# Patient Record
Sex: MALE | Race: WHITE | NOT HISPANIC OR LATINO | Employment: OTHER | URBAN - METROPOLITAN AREA
[De-identification: names, ages, dates, MRNs, and addresses within clinical notes are randomized per-mention and may not be internally consistent; named-entity substitution may affect disease eponyms.]

---

## 2018-01-04 ENCOUNTER — CONVERSION ENCOUNTER (OUTPATIENT)
Dept: OTHER | Facility: HOSPITAL | Age: 78
End: 2018-01-04

## 2018-06-06 ENCOUNTER — OFFICE VISIT CONVERTED (OUTPATIENT)
Dept: OTHER | Facility: HOSPITAL | Age: 78
End: 2018-06-06
Attending: NURSE PRACTITIONER

## 2018-07-17 ENCOUNTER — CONVERSION ENCOUNTER (OUTPATIENT)
Dept: OTHER | Facility: HOSPITAL | Age: 78
End: 2018-07-17

## 2018-07-17 ENCOUNTER — OFFICE VISIT CONVERTED (OUTPATIENT)
Dept: OTHER | Facility: HOSPITAL | Age: 78
End: 2018-07-17
Attending: NURSE PRACTITIONER

## 2018-12-06 ENCOUNTER — OFFICE VISIT CONVERTED (OUTPATIENT)
Dept: OTHER | Facility: HOSPITAL | Age: 78
End: 2018-12-06
Attending: NURSE PRACTITIONER

## 2019-01-08 ENCOUNTER — HOSPITAL ENCOUNTER (OUTPATIENT)
Dept: OTHER | Facility: HOSPITAL | Age: 79
Discharge: HOME | End: 2019-01-08
Attending: NURSE PRACTITIONER

## 2019-01-08 LAB
ALBUMIN SERPL-MCNC: 4.1 G/DL (ref 3.5–5)
ALBUMIN/GLOB SERPL: 1.7 {RATIO} (ref 1.4–2.6)
ALP SERPL-CCNC: 73 U/L (ref 56–155)
ALT SERPL-CCNC: 14 U/L (ref 10–40)
ANION GAP SERPL CALC-SCNC: 18 MMOL/L (ref 8–19)
AST SERPL-CCNC: 18 U/L (ref 15–50)
BASOPHILS # BLD AUTO: 0 10*3/UL (ref 0–0.2)
BASOPHILS NFR BLD AUTO: 0.06 % (ref 0–3)
BILIRUB SERPL-MCNC: 0.63 MG/DL (ref 0.2–1.3)
BUN SERPL-MCNC: 21 MG/DL (ref 5–25)
BUN/CREAT SERPL: 15 {RATIO} (ref 6–20)
CALCIUM SERPL-MCNC: 10.3 MG/DL (ref 8.7–10.4)
CHLORIDE SERPL-SCNC: 107 MMOL/L (ref 99–111)
CHOLEST SERPL-MCNC: 102 MG/DL (ref 107–200)
CHOLEST/HDLC SERPL: 2.2 {RATIO} (ref 3–6)
CONV CO2: 24 MMOL/L (ref 22–32)
CONV TOTAL PROTEIN: 6.5 G/DL (ref 6.3–8.2)
CREAT UR-MCNC: 1.41 MG/DL (ref 0.7–1.2)
EOSINOPHIL # BLD AUTO: 0.11 10*3/UL (ref 0–0.7)
EOSINOPHIL # BLD AUTO: 1.94 % (ref 0–7)
ERYTHROCYTE [DISTWIDTH] IN BLOOD BY AUTOMATED COUNT: 12.9 % (ref 11.5–14.5)
GFR SERPLBLD BASED ON 1.73 SQ M-ARVRAT: 47 ML/MIN/{1.73_M2}
GLOBULIN UR ELPH-MCNC: 2.4 G/DL (ref 2–3.5)
GLUCOSE SERPL-MCNC: 89 MG/DL (ref 70–99)
HBA1C MFR BLD: 13.3 G/DL (ref 14–18)
HCT VFR BLD AUTO: 40.2 % (ref 42–52)
HDLC SERPL-MCNC: 46 MG/DL (ref 40–60)
LDLC SERPL CALC-MCNC: 46 MG/DL (ref 70–100)
LYMPHOCYTES # BLD AUTO: 1.31 10*3/UL (ref 1–5)
MCH RBC QN AUTO: 29.7 PG (ref 27–31)
MCHC RBC AUTO-ENTMCNC: 33.2 G/DL (ref 33–37)
MCV RBC AUTO: 89.4 FL (ref 80–96)
MONOCYTES # BLD AUTO: 0.58 10*3/UL (ref 0.2–1.2)
MONOCYTES NFR BLD AUTO: 10.6 % (ref 3–10)
NEUTROPHILS # BLD AUTO: 3.47 10*3/UL (ref 2–8)
NEUTROPHILS NFR BLD AUTO: 63.5 % (ref 30–85)
NRBC BLD AUTO-RTO: 0 % (ref 0–0.01)
OSMOLALITY SERPL CALC.SUM OF ELEC: 300 MOSM/KG (ref 273–304)
PLATELET # BLD AUTO: 181 10*3/UL (ref 130–400)
PMV BLD AUTO: 8.2 FL (ref 7.4–10.4)
POTASSIUM SERPL-SCNC: 4.7 MMOL/L (ref 3.5–5.3)
PSA SERPL-MCNC: 3.53 NG/ML (ref 0–4)
RBC # BLD AUTO: 4.49 10*6/UL (ref 4.7–6.1)
SODIUM SERPL-SCNC: 144 MMOL/L (ref 135–147)
T4 FREE SERPL-MCNC: 0.9 NG/DL (ref 0.9–1.8)
TRIGL SERPL-MCNC: 52 MG/DL (ref 40–150)
TSH SERPL-ACNC: 3.4 M[IU]/L (ref 0.27–4.2)
VARIANT LYMPHS NFR BLD MANUAL: 24 % (ref 20–45)
VLDLC SERPL-MCNC: 10 MG/DL (ref 5–37)
WBC # BLD AUTO: 5.46 10*3/UL (ref 4.8–10.8)

## 2019-06-05 ENCOUNTER — OFFICE VISIT CONVERTED (OUTPATIENT)
Dept: OTHER | Facility: HOSPITAL | Age: 79
End: 2019-06-05
Attending: NURSE PRACTITIONER

## 2019-06-05 ENCOUNTER — CONVERSION ENCOUNTER (OUTPATIENT)
Dept: OTHER | Facility: HOSPITAL | Age: 79
End: 2019-06-05

## 2019-06-05 ENCOUNTER — HOSPITAL ENCOUNTER (OUTPATIENT)
Dept: OTHER | Facility: HOSPITAL | Age: 79
Discharge: HOME | End: 2019-06-05
Attending: NURSE PRACTITIONER

## 2019-06-05 LAB
ALBUMIN SERPL-MCNC: 4 G/DL (ref 3.5–5)
ALBUMIN/GLOB SERPL: 1.5 {RATIO} (ref 1.4–2.6)
ALP SERPL-CCNC: 81 U/L (ref 56–155)
ALT SERPL-CCNC: 12 U/L (ref 10–40)
ANION GAP SERPL CALC-SCNC: 13 MMOL/L (ref 8–19)
AST SERPL-CCNC: 18 U/L (ref 15–50)
BASOPHILS # BLD AUTO: 0.02 10*3/UL (ref 0–0.2)
BASOPHILS NFR BLD AUTO: 0.4 % (ref 0–3)
BILIRUB SERPL-MCNC: 0.39 MG/DL (ref 0.2–1.3)
BUN SERPL-MCNC: 22 MG/DL (ref 5–25)
BUN/CREAT SERPL: 17 {RATIO} (ref 6–20)
CALCIUM SERPL-MCNC: 10.7 MG/DL (ref 8.7–10.4)
CHLORIDE SERPL-SCNC: 104 MMOL/L (ref 99–111)
CHOLEST SERPL-MCNC: 105 MG/DL (ref 107–200)
CHOLEST/HDLC SERPL: 2.4 {RATIO} (ref 3–6)
CONV ABS IMM GRAN: 0.03 10*3/UL (ref 0–0.2)
CONV CO2: 26 MMOL/L (ref 22–32)
CONV IMMATURE GRAN: 0.5 % (ref 0–1.8)
CONV TOTAL PROTEIN: 6.6 G/DL (ref 6.3–8.2)
CREAT UR-MCNC: 1.26 MG/DL (ref 0.7–1.2)
DEPRECATED RDW RBC AUTO: 45.5 FL (ref 35.1–43.9)
EOSINOPHIL # BLD AUTO: 0.12 10*3/UL (ref 0–0.7)
EOSINOPHIL # BLD AUTO: 2.1 % (ref 0–7)
ERYTHROCYTE [DISTWIDTH] IN BLOOD BY AUTOMATED COUNT: 13.8 % (ref 11.6–14.4)
GFR SERPLBLD BASED ON 1.73 SQ M-ARVRAT: 54 ML/MIN/{1.73_M2}
GLOBULIN UR ELPH-MCNC: 2.6 G/DL (ref 2–3.5)
GLUCOSE SERPL-MCNC: 84 MG/DL (ref 70–99)
HBA1C MFR BLD: 12.6 G/DL (ref 14–18)
HCT VFR BLD AUTO: 39.4 % (ref 42–52)
HDLC SERPL-MCNC: 44 MG/DL (ref 40–60)
LDLC SERPL CALC-MCNC: 49 MG/DL (ref 70–100)
LYMPHOCYTES # BLD AUTO: 1.28 10*3/UL (ref 1–5)
MCH RBC QN AUTO: 28.9 PG (ref 27–31)
MCHC RBC AUTO-ENTMCNC: 32 G/DL (ref 33–37)
MCV RBC AUTO: 90.4 FL (ref 80–96)
MONOCYTES # BLD AUTO: 0.68 10*3/UL (ref 0.2–1.2)
MONOCYTES NFR BLD AUTO: 12.1 % (ref 3–10)
NEUTROPHILS # BLD AUTO: 3.51 10*3/UL (ref 2–8)
NEUTROPHILS NFR BLD AUTO: 62.2 % (ref 30–85)
NRBC CBCN: 0 % (ref 0–0.7)
OSMOLALITY SERPL CALC.SUM OF ELEC: 291 MOSM/KG (ref 273–304)
PLATELET # BLD AUTO: 167 10*3/UL (ref 130–400)
PMV BLD AUTO: 10.9 FL (ref 9.4–12.4)
POTASSIUM SERPL-SCNC: 4.4 MMOL/L (ref 3.5–5.3)
RBC # BLD AUTO: 4.36 10*6/UL (ref 4.7–6.1)
SODIUM SERPL-SCNC: 139 MMOL/L (ref 135–147)
TRIGL SERPL-MCNC: 58 MG/DL (ref 40–150)
VARIANT LYMPHS NFR BLD MANUAL: 22.7 % (ref 20–45)
VLDLC SERPL-MCNC: 12 MG/DL (ref 5–37)
WBC # BLD AUTO: 5.64 10*3/UL (ref 4.8–10.8)

## 2019-06-06 LAB
IRON SATN MFR SERPL: 17 % (ref 20–55)
IRON SERPL-MCNC: 53 UG/DL (ref 70–180)
TIBC SERPL-MCNC: 319 UG/DL (ref 245–450)
TRANSFERRIN SERPL-MCNC: 223 MG/DL (ref 215–365)

## 2019-07-25 ENCOUNTER — CONVERSION ENCOUNTER (OUTPATIENT)
Dept: OTHER | Facility: HOSPITAL | Age: 79
End: 2019-07-25

## 2019-07-25 ENCOUNTER — OFFICE VISIT CONVERTED (OUTPATIENT)
Dept: OTHER | Facility: HOSPITAL | Age: 79
End: 2019-07-25
Attending: NURSE PRACTITIONER

## 2019-10-24 ENCOUNTER — OFFICE VISIT CONVERTED (OUTPATIENT)
Dept: OTHER | Facility: HOSPITAL | Age: 79
End: 2019-10-24
Attending: NURSE PRACTITIONER

## 2019-10-24 ENCOUNTER — CONVERSION ENCOUNTER (OUTPATIENT)
Dept: OTHER | Facility: HOSPITAL | Age: 79
End: 2019-10-24

## 2019-11-25 ENCOUNTER — CONVERSION ENCOUNTER (OUTPATIENT)
Dept: OTHER | Facility: HOSPITAL | Age: 79
End: 2019-11-25

## 2019-11-25 ENCOUNTER — OFFICE VISIT CONVERTED (OUTPATIENT)
Dept: OTHER | Facility: HOSPITAL | Age: 79
End: 2019-11-25
Attending: NURSE PRACTITIONER

## 2019-11-25 ENCOUNTER — HOSPITAL ENCOUNTER (OUTPATIENT)
Dept: OTHER | Facility: HOSPITAL | Age: 79
Discharge: HOME | End: 2019-11-25
Attending: NURSE PRACTITIONER

## 2019-11-25 LAB
ALBUMIN SERPL-MCNC: 3.9 G/DL (ref 3.5–5)
ALBUMIN/GLOB SERPL: 1.4 {RATIO} (ref 1.4–2.6)
ALP SERPL-CCNC: 87 U/L (ref 56–155)
ALT SERPL-CCNC: 15 U/L (ref 10–40)
ANION GAP SERPL CALC-SCNC: 16 MMOL/L (ref 8–19)
AST SERPL-CCNC: 18 U/L (ref 15–50)
BASOPHILS # BLD AUTO: 0.02 10*3/UL (ref 0–0.2)
BASOPHILS NFR BLD AUTO: 0.4 % (ref 0–3)
BILIRUB SERPL-MCNC: 0.5 MG/DL (ref 0.2–1.3)
BUN SERPL-MCNC: 24 MG/DL (ref 5–25)
BUN/CREAT SERPL: 20 {RATIO} (ref 6–20)
CALCIUM SERPL-MCNC: 11 MG/DL (ref 8.7–10.4)
CHLORIDE SERPL-SCNC: 106 MMOL/L (ref 99–111)
CHOLEST SERPL-MCNC: 92 MG/DL (ref 107–200)
CHOLEST/HDLC SERPL: 2.2 {RATIO} (ref 3–6)
CONV ABS IMM GRAN: 0.01 10*3/UL (ref 0–0.2)
CONV CO2: 24 MMOL/L (ref 22–32)
CONV IMMATURE GRAN: 0.2 % (ref 0–1.8)
CONV TOTAL PROTEIN: 6.7 G/DL (ref 6.3–8.2)
CREAT UR-MCNC: 1.22 MG/DL (ref 0.7–1.2)
DEPRECATED RDW RBC AUTO: 45.2 FL (ref 35.1–43.9)
EOSINOPHIL # BLD AUTO: 0.17 10*3/UL (ref 0–0.7)
EOSINOPHIL # BLD AUTO: 3.3 % (ref 0–7)
ERYTHROCYTE [DISTWIDTH] IN BLOOD BY AUTOMATED COUNT: 13.6 % (ref 11.6–14.4)
GFR SERPLBLD BASED ON 1.73 SQ M-ARVRAT: 56 ML/MIN/{1.73_M2}
GLOBULIN UR ELPH-MCNC: 2.8 G/DL (ref 2–3.5)
GLUCOSE SERPL-MCNC: 86 MG/DL (ref 70–99)
HCT VFR BLD AUTO: 40.2 % (ref 42–52)
HDLC SERPL-MCNC: 41 MG/DL (ref 40–60)
HGB BLD-MCNC: 13.2 G/DL (ref 14–18)
IRON SATN MFR SERPL: 25 % (ref 20–55)
IRON SERPL-MCNC: 73 UG/DL (ref 70–180)
LDLC SERPL CALC-MCNC: 40 MG/DL (ref 70–100)
LYMPHOCYTES # BLD AUTO: 1.13 10*3/UL (ref 1–5)
LYMPHOCYTES NFR BLD AUTO: 21.7 % (ref 20–45)
MCH RBC QN AUTO: 29.7 PG (ref 27–31)
MCHC RBC AUTO-ENTMCNC: 32.8 G/DL (ref 33–37)
MCV RBC AUTO: 90.5 FL (ref 80–96)
MONOCYTES # BLD AUTO: 0.6 10*3/UL (ref 0.2–1.2)
MONOCYTES NFR BLD AUTO: 11.5 % (ref 3–10)
NEUTROPHILS # BLD AUTO: 3.27 10*3/UL (ref 2–8)
NEUTROPHILS NFR BLD AUTO: 62.9 % (ref 30–85)
NRBC CBCN: 0 % (ref 0–0.7)
OSMOLALITY SERPL CALC.SUM OF ELEC: 295 MOSM/KG (ref 273–304)
PLATELET # BLD AUTO: 204 10*3/UL (ref 130–400)
PMV BLD AUTO: 10.6 FL (ref 9.4–12.4)
POTASSIUM SERPL-SCNC: 5.1 MMOL/L (ref 3.5–5.3)
RBC # BLD AUTO: 4.44 10*6/UL (ref 4.7–6.1)
SODIUM SERPL-SCNC: 141 MMOL/L (ref 135–147)
TIBC SERPL-MCNC: 295 UG/DL (ref 245–450)
TRANSFERRIN SERPL-MCNC: 206 MG/DL (ref 215–365)
TRIGL SERPL-MCNC: 53 MG/DL (ref 40–150)
TSH SERPL-ACNC: 3.87 M[IU]/L (ref 0.27–4.2)
VLDLC SERPL-MCNC: 11 MG/DL (ref 5–37)
WBC # BLD AUTO: 5.2 10*3/UL (ref 4.8–10.8)

## 2020-02-25 ENCOUNTER — OFFICE VISIT CONVERTED (OUTPATIENT)
Dept: OTHER | Facility: HOSPITAL | Age: 80
End: 2020-02-25
Attending: NURSE PRACTITIONER

## 2020-02-25 ENCOUNTER — HOSPITAL ENCOUNTER (OUTPATIENT)
Dept: OTHER | Facility: HOSPITAL | Age: 80
Discharge: HOME | End: 2020-02-25
Attending: NURSE PRACTITIONER

## 2020-02-25 ENCOUNTER — CONVERSION ENCOUNTER (OUTPATIENT)
Dept: OTHER | Facility: HOSPITAL | Age: 80
End: 2020-02-25

## 2020-02-25 LAB
ANION GAP SERPL CALC-SCNC: 22 MMOL/L (ref 8–19)
BUN SERPL-MCNC: 21 MG/DL (ref 5–25)
BUN/CREAT SERPL: 17 {RATIO} (ref 6–20)
CALCIUM SERPL-MCNC: 10.8 MG/DL (ref 8.7–10.4)
CHLORIDE SERPL-SCNC: 105 MMOL/L (ref 99–111)
CONV CO2: 20 MMOL/L (ref 22–32)
CREAT UR-MCNC: 1.25 MG/DL (ref 0.7–1.2)
GFR SERPLBLD BASED ON 1.73 SQ M-ARVRAT: 54 ML/MIN/{1.73_M2}
GLUCOSE SERPL-MCNC: 89 MG/DL (ref 70–99)
OSMOLALITY SERPL CALC.SUM OF ELEC: 296 MOSM/KG (ref 273–304)
POTASSIUM SERPL-SCNC: 5.2 MMOL/L (ref 3.5–5.3)
PSA SERPL-MCNC: 3.3 NG/ML (ref 0–4)
PTH-INTACT SERPL-MCNC: 108.1 PG/ML (ref 11.1–79.5)
SODIUM SERPL-SCNC: 142 MMOL/L (ref 135–147)

## 2020-06-16 ENCOUNTER — HOSPITAL ENCOUNTER (OUTPATIENT)
Dept: OTHER | Facility: HOSPITAL | Age: 80
Discharge: HOME | End: 2020-06-16
Attending: NURSE PRACTITIONER

## 2020-06-16 ENCOUNTER — CONVERSION ENCOUNTER (OUTPATIENT)
Dept: OTHER | Facility: HOSPITAL | Age: 80
End: 2020-06-16

## 2020-06-16 ENCOUNTER — OFFICE VISIT CONVERTED (OUTPATIENT)
Dept: OTHER | Facility: HOSPITAL | Age: 80
End: 2020-06-16
Attending: NURSE PRACTITIONER

## 2020-06-16 LAB
ALBUMIN SERPL-MCNC: 3.9 G/DL (ref 3.5–5)
ALBUMIN/GLOB SERPL: 1.5 {RATIO} (ref 1.4–2.6)
ALP SERPL-CCNC: 82 U/L (ref 56–155)
ALT SERPL-CCNC: 9 U/L (ref 10–40)
ANION GAP SERPL CALC-SCNC: 16 MMOL/L (ref 8–19)
AST SERPL-CCNC: 16 U/L (ref 15–50)
BASOPHILS # BLD AUTO: 0.01 10*3/UL (ref 0–0.2)
BASOPHILS NFR BLD AUTO: 0.2 % (ref 0–3)
BILIRUB SERPL-MCNC: 0.56 MG/DL (ref 0.2–1.3)
BUN SERPL-MCNC: 21 MG/DL (ref 5–25)
BUN/CREAT SERPL: 16 {RATIO} (ref 6–20)
CALCIUM SERPL-MCNC: 10.8 MG/DL (ref 8.7–10.4)
CHLORIDE SERPL-SCNC: 106 MMOL/L (ref 99–111)
CHOLEST SERPL-MCNC: 90 MG/DL (ref 107–200)
CHOLEST/HDLC SERPL: 2.5 {RATIO} (ref 3–6)
CONV ABS IMM GRAN: 0.01 10*3/UL (ref 0–0.2)
CONV CO2: 24 MMOL/L (ref 22–32)
CONV IMMATURE GRAN: 0.2 % (ref 0–1.8)
CONV TOTAL PROTEIN: 6.5 G/DL (ref 6.3–8.2)
CREAT UR-MCNC: 1.29 MG/DL (ref 0.7–1.2)
DEPRECATED RDW RBC AUTO: 48.1 FL (ref 35.1–43.9)
EOSINOPHIL # BLD AUTO: 0.24 10*3/UL (ref 0–0.7)
EOSINOPHIL # BLD AUTO: 4.2 % (ref 0–7)
ERYTHROCYTE [DISTWIDTH] IN BLOOD BY AUTOMATED COUNT: 14.2 % (ref 11.6–14.4)
GFR SERPLBLD BASED ON 1.73 SQ M-ARVRAT: 52 ML/MIN/{1.73_M2}
GLOBULIN UR ELPH-MCNC: 2.6 G/DL (ref 2–3.5)
GLUCOSE SERPL-MCNC: 81 MG/DL (ref 70–99)
HCT VFR BLD AUTO: 39.9 % (ref 42–52)
HDLC SERPL-MCNC: 36 MG/DL (ref 40–60)
HGB BLD-MCNC: 12.9 G/DL (ref 14–18)
LDLC SERPL CALC-MCNC: 42 MG/DL (ref 70–100)
LYMPHOCYTES # BLD AUTO: 1.14 10*3/UL (ref 1–5)
LYMPHOCYTES NFR BLD AUTO: 20.2 % (ref 20–45)
MCH RBC QN AUTO: 29.7 PG (ref 27–31)
MCHC RBC AUTO-ENTMCNC: 32.3 G/DL (ref 33–37)
MCV RBC AUTO: 91.9 FL (ref 80–96)
MONOCYTES # BLD AUTO: 0.66 10*3/UL (ref 0.2–1.2)
MONOCYTES NFR BLD AUTO: 11.7 % (ref 3–10)
NEUTROPHILS # BLD AUTO: 3.59 10*3/UL (ref 2–8)
NEUTROPHILS NFR BLD AUTO: 63.5 % (ref 30–85)
NRBC CBCN: 0 % (ref 0–0.7)
OSMOLALITY SERPL CALC.SUM OF ELEC: 294 MOSM/KG (ref 273–304)
PLATELET # BLD AUTO: 165 10*3/UL (ref 130–400)
PMV BLD AUTO: 11 FL (ref 9.4–12.4)
POTASSIUM SERPL-SCNC: 4.6 MMOL/L (ref 3.5–5.3)
RBC # BLD AUTO: 4.34 10*6/UL (ref 4.7–6.1)
SODIUM SERPL-SCNC: 141 MMOL/L (ref 135–147)
TRIGL SERPL-MCNC: 59 MG/DL (ref 40–150)
TSH SERPL-ACNC: 5.51 M[IU]/L (ref 0.27–4.2)
VLDLC SERPL-MCNC: 12 MG/DL (ref 5–37)
WBC # BLD AUTO: 5.65 10*3/UL (ref 4.8–10.8)

## 2021-05-13 NOTE — PROGRESS NOTES
Progress Note      Patient Name: Tripp Younger   Patient ID: [id number]   Sex: Male   YOB: 1940    Primary Care Provider: Gosia VAUGHAN   Referring Provider: Gosia VAUGHAN    Visit Date: June 16, 2020    Provider: ALEXUS Weber   Location: [facility]   Location Address: [address] [place] [address]  [id number]   Location Phone: [phone]          Chief Complaint  · Follow up      History Of Present Illness  Tripp Younger is a 80 year old /White male who presents for evaluation and treatment of:      Follow up on BPH, Hypercalcemia, Frequency of urination, and Nocturia. Patient is on pravastatin for hyperlipidemia tolerates well denies side effects.  He does have some BPH which is managed with tamsulosin and he does over-the-counter saw palmetto.  He also has a component of overactive bladder and we have started him on Myrbetriq which has helped significantly.  He is on ferrous sulfate for iron deficiency anemia.  He is using meloxicam for osteoarthritis, last creatinine was 1.25 with a GFR of 54.  His calcium was noted to be slightly elevated at 10.8, we did do a PTH which was elevated at 108.1.  We will reevaluate today.       Past Medical History  Disease Name Date Onset Notes   Anxiety disorder 06/06/2018 --    Arthralgia, unspecified joint 06/06/2018 --    BPH --  --    Chills (without fever) 07/28/2019 --    Elevated TSH --  --    Generalized anxiety disorder 12/07/2018 --    HLD (hyperlipidemia) --  --    HTN (hypertension) --  --    Neuropathy --  Left foot         Past Surgical History  Procedure Name Date Notes   Colonoscopy 2017 --    Inguinal Hernia Repair --  --          Medication List  Name Date Started Instructions   aspirin 81 mg oral tablet,delayed release (DR/EC)  take 1 tablet (81 mg) by oral route once daily   ferrous sulfate 325 mg (65 mg iron) oral tablet 06/16/2020 take 1 tablet (325 mg) by oral route 2 times per day for 30  days   Fish Oil oral  --    meclizine 25 mg oral tablet 02/25/2020 take 1 tablet by oral route daily as needed for 30 days   meloxicam 7.5 mg oral tablet 06/16/2020 take 1 tablet (7.5 mg) by oral route once daily for 90 days   multivitamin oral tablet  take 1 tablet by oral route daily   Myrbetriq 25 mg oral tablet extended release 24 hr 05/30/2020 TAKE 1 TABLET BY MOUTH ONCE DAILY SWALLOWING WHOLE WITH WATER. DO NOT CRUSH, CHEW AND OR DIVIDE   niacin oral  --    pravastatin 40 mg oral tablet 05/30/2020 TAKE 1 TABLET BY MOUTH ONCE DAILY AT BEDTIME   saw palmetto oral  --    sertraline 25 mg oral tablet 06/16/2020 TAKE 1 TABLET BY MOUTH ONCE DAILY for 90 days   tamsulosin 0.4 mg oral capsule 05/30/2020 TAKE 1 CAPSULE BY MOUTH ONCE DAILY 1/2 HOUR FOLLOWING THE SAME MEAL EACH DAY   Tylenol 325 mg oral tablet  take 1 tablet (325 mg) by oral route every day with Meloxicam as needed         Allergy List  Allergen Name Date Reaction Notes   NO KNOWN DRUG ALLERGIES --  --  --        Allergies Reconciled  Family Medical History  Disease Name Relative/Age Notes   Congestive Heart Failure Mother/   --          Social History  Finding Status Start/Stop Quantity Notes   Alcohol Never --/-- --  01/04/2018 -    Denies substance abuse --  --/-- --  --    Tobacco Never --/-- --  --          Immunizations  NameDate Admin Mfg Trade Name Lot Number Route Inj VIS Given VIS Publication   Ybbzbsshb08/24/2019 [facility] FLUZONE-HIGH DOSE [id number] [facility] 10/24/2019    Comments:    Umckoxnyg43/06/2018 [facility] Fluzone Quadrivalent [id number] [facility] 12/06/2018 08/07/2015   Comments: PT TOLERATED WELL   Hlkxfasyr0339/01/2017 NE Not Entered  NE NE 12/01/2017    Comments:    Prevnar 1312/01/2016 NE Not Entered  NE NE 12/01/2016    Comments:          Review of Systems  · Constitutional  o Denies  o : sick contacts, fever, chills, fatigue, weakness  · Cardiovascular  o Denies  o : dypnea on exertion, pain in chest  · Respiratory  o Denies  o : shortness of breath,  cough  · Gastrointestinal  o Denies  o : diarrhea, constipation  · Genitourinary  o Admits  o : frequency  o Denies  o : urgency, dysuria, hematuria  · Musculoskeletal  o Admits  o : arthralgias       Vitals  Date Time BP Position Site L\R Cuff Size HR RR TEMP (F) WT  HT  BMI kg/m2 BSA m2 O2 Sat        06/16/2020 09:44 /70 Sitting    67 - R 18 97.7 144lbs 0oz 6'   19.53 1.82 98 %          Physical Examination  · Constitutional  o Appearance  o : well-nourished, well developed, alert, in no acute distress, well-tended appearance  · Head and Face  o Head  o :   § Inspection  § : atraumatic, normocephalic  · Eyes  o Eyes  o : extraocular movements intact, no scleral icterus, no conjunctival injection  · Respiratory  o Respiratory Effort  o : breathing comfortably, symmetric chest rise  o Auscultation of Lungs  o : clear to asculatation bilaterally, no wheezes, rales, or rhonchii  · Cardiovascular  o Heart  o :   § Auscultation of Heart  § : regular rate and rhythm, no murmurs, rubs, or gallops  o Peripheral Vascular System  o :   § Carotid Arteries  § : normal pulses bilaterally, no bruits present  § Extremities  § : no edema  · Gastrointestinal  o Abdominal Examination  o :   § Abdomen  § : bowel sounds present, non-distended, non-tender  · Skin and Subcutaneous Tissue  o General Inspection  o : no lesions present, no areas of discoloration, skin turgor normal  · Neurologic  o Mental Status Examination  o :   § Orientation  § : grossly oriented to person, place and time  o Gait and Station  o :   § Gait Screening  § : normal gait  · Psychiatric  o General  o : normal mood and affect              Assessment  · BPH (benign prostatic hyperplasia)     600.00/N40.0  · Hyperlipidemia     272.4/E78.5  · Arthralgia, unspecified joint     719.40/M25.50  · Hypercalcemia     275.42/E83.52    Problems Reconciled  Plan  · Orders  o Physical, Primary Care Panel (CBC, CMP, Lipid, TSH) [facility] ([id number], [id number], [id number], [id number]) -  272.4/E78.5 - 06/16/2020  o ACO-39: Current medications updated and reviewed () - - 06/16/2020  o ACO-14: Influenza immunization administered or previously received () - - 06/16/2020  · Medications  o ferrous sulfate 325 mg (65 mg iron) oral tablet   SIG: take 1 tablet (325 mg) by oral route 2 times per day for 30 days   DISP: (60) tablets with 5 refills  Refilled on 06/16/2020     o meloxicam 7.5 mg oral tablet   SIG: take 1 tablet (7.5 mg) by oral route once daily for 90 days   DISP: (90) tablet with 1 refills  Refilled on 06/16/2020     o sertraline 25 mg oral tablet   SIG: TAKE 1 TABLET BY MOUTH ONCE DAILY for 90 days   DISP: (90) Tablet with 1 refills  Refilled on 06/16/2020     o Medications have been Reconciled  o Transition of Care or Provider Policy  · Instructions  o Patient is taking medications as prescribed and doing well.   o Patient was educated/instructed on their diagnosis, treatment and medications prior to discharge from the clinic today.  o Call the office with any concerns or questions.  o Chronic conditions reviewed and taken into consideration for today's treatment plan.  · Disposition  o Follow Up in 6 months     I will see Tripp back in 6 months for routine follow-up, encourage patient that if he were to need me sooner to please follow-up.    EMR dragon/transcription disclaimer: Much of this encounter note is an electronic transcription/translation of spoken language to printed text.  Electronic translation of spoken language may permit erroneous, or at times nonsensical words or phrases to be inadvertently transcribed; although I have reviewed the note for such errors, some may still exist.             Electronically Signed by: ALEXUS Weber -Author on June 16, 2020 09:17:15 AM

## 2021-05-15 VITALS
DIASTOLIC BLOOD PRESSURE: 78 MMHG | SYSTOLIC BLOOD PRESSURE: 114 MMHG | RESPIRATION RATE: 16 BRPM | HEART RATE: 66 BPM | HEIGHT: 72 IN | BODY MASS INDEX: 19.64 KG/M2 | WEIGHT: 145 LBS | OXYGEN SATURATION: 97 % | TEMPERATURE: 98 F

## 2021-05-15 VITALS
TEMPERATURE: 97.5 F | SYSTOLIC BLOOD PRESSURE: 128 MMHG | WEIGHT: 149.12 LBS | BODY MASS INDEX: 20.2 KG/M2 | DIASTOLIC BLOOD PRESSURE: 82 MMHG | OXYGEN SATURATION: 99 % | RESPIRATION RATE: 18 BRPM | HEIGHT: 72 IN | HEART RATE: 79 BPM

## 2021-05-15 VITALS
DIASTOLIC BLOOD PRESSURE: 68 MMHG | HEART RATE: 72 BPM | TEMPERATURE: 98 F | HEIGHT: 72 IN | BODY MASS INDEX: 19.88 KG/M2 | WEIGHT: 146.75 LBS | SYSTOLIC BLOOD PRESSURE: 124 MMHG | OXYGEN SATURATION: 98 %

## 2021-05-15 VITALS
OXYGEN SATURATION: 98 % | HEIGHT: 72 IN | TEMPERATURE: 97.7 F | RESPIRATION RATE: 18 BRPM | SYSTOLIC BLOOD PRESSURE: 110 MMHG | BODY MASS INDEX: 19.5 KG/M2 | HEART RATE: 67 BPM | WEIGHT: 144 LBS | DIASTOLIC BLOOD PRESSURE: 70 MMHG

## 2021-05-15 VITALS
WEIGHT: 150 LBS | TEMPERATURE: 97.7 F | HEIGHT: 72 IN | SYSTOLIC BLOOD PRESSURE: 140 MMHG | BODY MASS INDEX: 20.32 KG/M2 | HEART RATE: 83 BPM | RESPIRATION RATE: 18 BRPM | OXYGEN SATURATION: 98 % | DIASTOLIC BLOOD PRESSURE: 82 MMHG

## 2021-05-15 VITALS
DIASTOLIC BLOOD PRESSURE: 80 MMHG | BODY MASS INDEX: 20.18 KG/M2 | WEIGHT: 149 LBS | OXYGEN SATURATION: 98 % | RESPIRATION RATE: 16 BRPM | HEIGHT: 72 IN | TEMPERATURE: 97 F | HEART RATE: 82 BPM | SYSTOLIC BLOOD PRESSURE: 132 MMHG

## 2021-05-16 VITALS
TEMPERATURE: 97.8 F | HEART RATE: 76 BPM | HEIGHT: 72 IN | WEIGHT: 151.5 LBS | OXYGEN SATURATION: 98 % | BODY MASS INDEX: 20.52 KG/M2 | DIASTOLIC BLOOD PRESSURE: 83 MMHG | RESPIRATION RATE: 16 BRPM | SYSTOLIC BLOOD PRESSURE: 138 MMHG

## 2021-05-16 VITALS
TEMPERATURE: 97.8 F | WEIGHT: 154 LBS | BODY MASS INDEX: 20.41 KG/M2 | HEIGHT: 73 IN | HEART RATE: 78 BPM | OXYGEN SATURATION: 97 % | RESPIRATION RATE: 16 BRPM | DIASTOLIC BLOOD PRESSURE: 80 MMHG | SYSTOLIC BLOOD PRESSURE: 106 MMHG

## 2021-05-16 VITALS
BODY MASS INDEX: 20.18 KG/M2 | SYSTOLIC BLOOD PRESSURE: 130 MMHG | OXYGEN SATURATION: 98 % | HEIGHT: 72 IN | TEMPERATURE: 98 F | RESPIRATION RATE: 16 BRPM | WEIGHT: 149 LBS | HEART RATE: 80 BPM | DIASTOLIC BLOOD PRESSURE: 80 MMHG

## 2021-05-16 VITALS
DIASTOLIC BLOOD PRESSURE: 62 MMHG | HEART RATE: 74 BPM | TEMPERATURE: 97.6 F | WEIGHT: 149 LBS | HEIGHT: 72 IN | SYSTOLIC BLOOD PRESSURE: 116 MMHG | OXYGEN SATURATION: 98 % | BODY MASS INDEX: 20.18 KG/M2

## 2021-12-17 ENCOUNTER — TELEPHONE (OUTPATIENT)
Dept: UROLOGY | Facility: CLINIC | Age: 81
End: 2021-12-17

## 2021-12-17 NOTE — TELEPHONE ENCOUNTER
Selena called to refer for Gosia Kearney.  Urinary retention.    Patient was seen at [facility] ER 12/07.  Urinary retention and hematuria. Catheter placed at ER.  No imaging.  Records in [facility].    Office not from Gosia Kearney in [facility].    Office closes today at noon.  Please call patient, if after noon.

## 2021-12-17 NOTE — TELEPHONE ENCOUNTER
Spoke to Selena King's office. Notified them that  can see patient on Monday 12/20/21 @11:45 AM. They will call patient with that appointment information.

## 2021-12-20 ENCOUNTER — OFFICE VISIT (OUTPATIENT)
Dept: UROLOGY | Facility: CLINIC | Age: 81
End: 2021-12-20

## 2021-12-20 VITALS
SYSTOLIC BLOOD PRESSURE: 153 MMHG | BODY MASS INDEX: 19.13 KG/M2 | HEART RATE: 71 BPM | WEIGHT: 141.2 LBS | DIASTOLIC BLOOD PRESSURE: 86 MMHG | HEIGHT: 72 IN

## 2021-12-20 DIAGNOSIS — R33.9 RETENTION OF URINE: ICD-10-CM

## 2021-12-20 DIAGNOSIS — N40.1 BPH WITH OBSTRUCTION/LOWER URINARY TRACT SYMPTOMS: Primary | ICD-10-CM

## 2021-12-20 DIAGNOSIS — N13.8 BPH WITH OBSTRUCTION/LOWER URINARY TRACT SYMPTOMS: Primary | ICD-10-CM

## 2021-12-20 LAB — SPECIMEN VOL SMN: NORMAL ML

## 2021-12-20 PROCEDURE — 99204 OFFICE O/P NEW MOD 45 MIN: CPT | Performed by: UROLOGY

## 2021-12-20 PROCEDURE — 51798 US URINE CAPACITY MEASURE: CPT | Performed by: UROLOGY

## 2021-12-20 PROCEDURE — 51702 INSERT TEMP BLADDER CATH: CPT | Performed by: UROLOGY

## 2021-12-20 RX ORDER — MECLIZINE HYDROCHLORIDE 25 MG/1
25 TABLET ORAL
COMMUNITY
Start: 2021-12-15 | End: 2022-01-15

## 2021-12-20 RX ORDER — FERROUS SULFATE 325(65) MG
325 TABLET ORAL
COMMUNITY
Start: 2021-12-15 | End: 2022-12-16

## 2021-12-20 RX ORDER — CETIRIZINE HYDROCHLORIDE 10 MG/1
10 TABLET ORAL DAILY
COMMUNITY
Start: 2021-12-15 | End: 2022-07-18

## 2021-12-20 RX ORDER — SERTRALINE HYDROCHLORIDE 25 MG/1
25 TABLET, FILM COATED ORAL DAILY
COMMUNITY
Start: 2021-12-15 | End: 2022-12-16

## 2021-12-20 RX ORDER — CHLORAL HYDRATE 500 MG
CAPSULE ORAL
COMMUNITY
End: 2022-07-18

## 2021-12-20 RX ORDER — FAMOTIDINE 40 MG/1
40 TABLET, FILM COATED ORAL
COMMUNITY
Start: 2021-12-15 | End: 2022-12-16

## 2021-12-20 RX ORDER — FINASTERIDE 5 MG/1
5 TABLET, FILM COATED ORAL DAILY
Qty: 90 TABLET | Refills: 3 | Status: SHIPPED | OUTPATIENT
Start: 2021-12-20 | End: 2022-12-15

## 2021-12-20 RX ORDER — GABAPENTIN 100 MG/1
CAPSULE ORAL
COMMUNITY
End: 2022-01-17 | Stop reason: ALTCHOICE

## 2021-12-20 RX ORDER — BILBERRY FRUIT 1000 MG
CAPSULE ORAL
COMMUNITY
End: 2022-07-18

## 2021-12-20 RX ORDER — MELOXICAM 7.5 MG/1
7.5 TABLET ORAL DAILY
COMMUNITY
Start: 2021-12-15 | End: 2022-12-16

## 2021-12-20 RX ORDER — AMOXICILLIN AND CLAVULANATE POTASSIUM 875; 125 MG/1; MG/1
TABLET, FILM COATED ORAL
COMMUNITY
Start: 2021-12-07 | End: 2022-01-17

## 2021-12-20 RX ORDER — LEVOTHYROXINE SODIUM 0.03 MG/1
25 TABLET ORAL
COMMUNITY
Start: 2021-12-15 | End: 2022-12-16

## 2021-12-20 RX ORDER — ACETAMINOPHEN 325 MG/1
TABLET ORAL
COMMUNITY
End: 2022-07-18

## 2021-12-20 RX ORDER — NIACIN 100 MG
TABLET ORAL
COMMUNITY

## 2021-12-20 RX ORDER — PRAVASTATIN SODIUM 40 MG
40 TABLET ORAL
COMMUNITY
Start: 2021-12-15 | End: 2022-12-16

## 2021-12-20 RX ORDER — ASPIRIN 81 MG/1
TABLET ORAL
COMMUNITY

## 2021-12-20 RX ORDER — TAMSULOSIN HYDROCHLORIDE 0.4 MG/1
0.4 CAPSULE ORAL DAILY
COMMUNITY
Start: 2021-12-15 | End: 2022-12-16

## 2021-12-20 RX ORDER — LATANOPROSTENE BUNOD 0.24 MG/ML
SOLUTION/ DROPS OPHTHALMIC
COMMUNITY
Start: 2021-10-11

## 2021-12-20 NOTE — PROGRESS NOTES
"Chief Complaint  Follow-up (pvr)    Subjective          Verbal consent obtained for recording.    Tripp Younger presents to [facility] UROLOGY  History of Present Illness    Mr. Almaguer is a new patient for urinary retention. He was seen at [facility] ER on 12/7/2021 and he was found to be in urinary retention. He had a Soler catheter placed and is here today for possible void trial.    He reports that he is doing good. He states that he still has his catheter in place. He reports that was his first time he had been to the ER. He states that he had an infection but he usually drinks more water, but that did not work. He reports that his urine is clear. He reports that he has to use to the bathroom too much even when he does not have an infection. He reports that he has had this issue for a long time and that he just \"puts up with it.\"  He reports that he has had an enlarged prostate for years. He reports that he does well during the day.    He reports that he is on a medication for his prostate. He states that he has been on his medications for quite a while. He reports that he is taking Flomax and saw palmetto. He denies ever taking finasteride.      Objective   Vital Signs:   /86   Pulse 71   Ht 182.9 cm (72\")   Wt 64 kg (141 lb 3.2 oz)   BMI 19.15 kg/m²     Physical Exam  Vitals and nursing note reviewed.   Constitutional:       Appearance: Normal appearance. He is well-developed.   Pulmonary:      Effort: Pulmonary effort is normal.      Breath sounds: Normal air entry.   Neurological:      Mental Status: He is alert and oriented to person, place, and time.      Motor: Motor function is intact.   Psychiatric:         Mood and Affect: Mood normal.         Behavior: Behavior normal.        Result Review :   The following data was reviewed by: Shyann Lin MD on 12/20/2021:           Urinalysis, Microscopic Only - (12/07/2021 15:40)  Urinalysis With Culture If " Indicated - (12/07/2021 15:40)    His Soler catheter was removed after I filled him up with about 300 units of sterile water and he will try and urinate.     Bladder Scan interpretation 12/20/2021    Estimation of residual urine via BVI 3000 Verathon Bladder Scan  Residual Urine: 400 ml  Indication: BPH with obstruction/lower urinary tract symptoms    Retention of urine   Position: Supine  Examination: Incremental scanning of the suprapubic area using 2.0 MHz transducer using copious amounts of acoustic gel.   Findings: An anechoic area was demonstrated which represented the bladder, with measurement of residual urine as noted. I inspected this myself. In that the residual urine was stable or insignificant, refer to plan for treatment and plan necessary at this time.     Insert Temp Indwelling Blad Cath, Simple    Date/Time: 12/21/2021 11:31 AM  Performed by: Shyann Lin MD  Authorized by: Shyann Lin MD   Preparation: Patient was prepped and draped in the usual sterile fashion.  Local anesthesia used: no    Anesthesia:  Local anesthesia used: no    Sedation:  Patient sedated: no    Patient tolerance: patient tolerated the procedure well with no immediate complications  Comments: 18 [place] coude catheter inserted using sterile technique.  Return of clear yellow urine.              Results for orders placed or performed in visit on 12/20/21   Bladder Scan   Result Value Ref Range    Volume: 400ml        Assessment and Plan    Diagnoses and all orders for this visit:    1. BPH with obstruction/lower urinary tract symptoms (Primary)  -     finasteride (PROSCAR) 5 MG tablet; Take 1 tablet by mouth Daily for 360 days.  Dispense: 90 tablet; Refill: 3    2. Retention of urine  -     finasteride (PROSCAR) 5 MG tablet; Take 1 tablet by mouth Daily for 360 days.  Dispense: 90 tablet; Refill: 3    Other orders  -     Bladder Scan      The patient was initially unable to urinate and left the office to go have  lunch and attempt to walk around to see if he could urinate.   He was unable.  He returned to office and an 18 [place] coude catheter was inserted under sterile conditions.  He will return for another void trial in one week/          Follow Up   No follow-ups on file.  Patient was given instructions and counseling regarding his condition or for health maintenance advice. Please see specific information pulled into the AVS if appropriate.     Transcribed from ambient dictation for Shyann Lin MD by Cathleen Cabrera.  12/20/21   18:06 EST    Patient verbalized consent to the visit recording.  I have personally performed the services described in this document as transcribed by the above individual, and it is both accurate and complete.  Shyann Lin MD  12/21/2021  11:28 EST

## 2021-12-27 ENCOUNTER — OFFICE VISIT (OUTPATIENT)
Dept: UROLOGY | Facility: CLINIC | Age: 81
End: 2021-12-27

## 2021-12-27 VITALS
BODY MASS INDEX: 19.18 KG/M2 | DIASTOLIC BLOOD PRESSURE: 86 MMHG | WEIGHT: 141.6 LBS | SYSTOLIC BLOOD PRESSURE: 150 MMHG | HEART RATE: 77 BPM | HEIGHT: 72 IN | RESPIRATION RATE: 16 BRPM

## 2021-12-27 DIAGNOSIS — R33.9 RETENTION OF URINE: Primary | ICD-10-CM

## 2021-12-27 LAB — URINE VOLUME: 253

## 2021-12-27 PROCEDURE — 51798 US URINE CAPACITY MEASURE: CPT | Performed by: UROLOGY

## 2021-12-27 PROCEDURE — 99212 OFFICE O/P EST SF 10 MIN: CPT | Performed by: UROLOGY

## 2021-12-28 ENCOUNTER — TELEPHONE (OUTPATIENT)
Dept: UROLOGY | Facility: CLINIC | Age: 81
End: 2021-12-28

## 2021-12-28 NOTE — TELEPHONE ENCOUNTER
Spoke to patient and he states he is urinating much better. Discussed with patient that  wanted to see him back in one week for repeat PVR. Scheduled patient appointment for 1/5/22 at which time he will also discuss with  what can be done to prevent future retention issues.

## 2021-12-28 NOTE — TELEPHONE ENCOUNTER
Patient said his catheter was pulled in office 12/27, but he was not able to urinate while he was at the office.  After he got home, he was able to urinate last night, and kidney are working.  He didn't have to go to the ER to have another catheter placed.  He said he was to let dr. Lin know, and she would decide what to do at that time.

## 2021-12-28 NOTE — PROGRESS NOTES
"Chief Complaint  No chief complaint on file.    Subjective          Tripp Younger presents to [facility] UROLOGY  History of Present Illness     Mr. Tripp Younger is an 81-year-old male who is here for a void trial. He was seen on the 12/20/2021 at which time he had a void trial that he failed. He is currently on Flomax and finasteride. We placed 240 cc of sterile water into his bladder today through his Soler catheter and removed it. He was able to urinate initially about 100 mL and then left and went to lunch. He has not been able to urinate since. Bladder scan today here in the office reveals 250 mL and he would like to try to continue on his own. His catheter was not replaced.     Objective   Vital Signs:   /86   Pulse 77   Resp 16   Ht 182.9 cm (72\")   Wt 64.2 kg (141 lb 9.6 oz)   BMI 19.20 kg/m²     Physical Exam  Vitals and nursing note reviewed.   Constitutional:       Appearance: Normal appearance. He is well-developed.   Pulmonary:      Effort: Pulmonary effort is normal.      Breath sounds: Normal air entry.   Neurological:      Mental Status: He is alert and oriented to person, place, and time.      Motor: Motor function is intact.   Psychiatric:         Mood and Affect: Mood normal.         Behavior: Behavior normal.        Result Review :   The following data was reviewed by: Shyann Lin MD on 12/27/2021:           Bladder Scan interpretation 12/27/2021    Estimation of residual urine via BVI 3000 Verathon Bladder Scan  Residual Urine: 253 ml  Indication: Retention of urine   Position: Supine  Examination: Incremental scanning of the suprapubic area using 2.0 MHz transducer using copious amounts of acoustic gel.   Findings: An anechoic area was demonstrated which represented the bladder, with measurement of residual urine as noted. I inspected this myself. In that the residual urine was stable or insignificant, refer to plan for treatment and plan necessary at " this time.         Results for orders placed or performed in visit on 12/27/21   Bladder Scan   Result Value Ref Range    Urine Volume 253        Assessment and Plan    Diagnoses and all orders for this visit:    1. Retention of urine (Primary)  He will follow-up in 1 week and will call us tomorrow to let us know whether he was able to continue to urinate on his own or not.  -     Bladder Scan        Follow Up   Return in about 1 week (around 1/3/2022).  Patient was given instructions and counseling regarding his condition or for health maintenance advice. Please see specific information pulled into the AVS if appropriate.     Transcribed from ambient dictation for Shyann Lin MD by Malissa Patton/Taty Adan  12/28/21   10:30 EST    Patient verbalized consent to the visit recording.  I have personally performed the services described in this document as transcribed by the above individual, and it is both accurate and complete.  Shyann Lin MD  12/28/2021  14:44 EST

## 2022-01-05 ENCOUNTER — OFFICE VISIT (OUTPATIENT)
Dept: UROLOGY | Facility: CLINIC | Age: 82
End: 2022-01-05

## 2022-01-05 VITALS
HEIGHT: 72 IN | HEART RATE: 71 BPM | RESPIRATION RATE: 16 BRPM | DIASTOLIC BLOOD PRESSURE: 84 MMHG | WEIGHT: 140.2 LBS | SYSTOLIC BLOOD PRESSURE: 147 MMHG | BODY MASS INDEX: 18.99 KG/M2

## 2022-01-05 DIAGNOSIS — N40.1 BPH WITH OBSTRUCTION/LOWER URINARY TRACT SYMPTOMS: ICD-10-CM

## 2022-01-05 DIAGNOSIS — N13.8 BPH WITH OBSTRUCTION/LOWER URINARY TRACT SYMPTOMS: ICD-10-CM

## 2022-01-05 DIAGNOSIS — R33.9 URINARY RETENTION: Primary | ICD-10-CM

## 2022-01-05 LAB
BILIRUB BLD-MCNC: NEGATIVE MG/DL
CLARITY, POC: CLEAR
COLOR UR: YELLOW
EXPIRATION DATE: ABNORMAL
GLUCOSE UR STRIP-MCNC: NEGATIVE MG/DL
KETONES UR QL: NEGATIVE
LEUKOCYTE EST, POC: ABNORMAL
Lab: ABNORMAL
NITRITE UR-MCNC: POSITIVE MG/ML
PH UR: 5.5 [PH] (ref 5–8)
PROT UR STRIP-MCNC: NEGATIVE MG/DL
RBC # UR STRIP: ABNORMAL /UL
SP GR UR: 1.01 (ref 1–1.03)
URINE VOLUME: 838
UROBILINOGEN UR QL: NORMAL

## 2022-01-05 PROCEDURE — 87147 CULTURE TYPE IMMUNOLOGIC: CPT | Performed by: UROLOGY

## 2022-01-05 PROCEDURE — 87086 URINE CULTURE/COLONY COUNT: CPT | Performed by: UROLOGY

## 2022-01-05 PROCEDURE — 81003 URINALYSIS AUTO W/O SCOPE: CPT | Performed by: UROLOGY

## 2022-01-05 PROCEDURE — 51798 US URINE CAPACITY MEASURE: CPT | Performed by: UROLOGY

## 2022-01-05 PROCEDURE — 99213 OFFICE O/P EST LOW 20 MIN: CPT | Performed by: UROLOGY

## 2022-01-05 NOTE — PROGRESS NOTES
"Chief Complaint  No chief complaint on file.    Subjective          Tripp Younger presents to [facility] UROLOG[facility]  History of Present Illness     Patient was unable to void today.  He will need to straight cath at home.  He is agreeable to this. Patient will need to self-cath 4 X day/ 14 FR coude due to inability to pass straight cath.       Objective   Vital Signs:   /84   Pulse 71   Resp 16   Ht 182.9 cm (72\")   Wt 63.6 kg (140 lb 3.2 oz)   BMI 19.01 kg/m²     Physical Exam  Vitals and nursing note reviewed.   Constitutional:       Appearance: Normal appearance. He is well-developed.   Pulmonary:      Effort: Pulmonary effort is normal.      Breath sounds: Normal air entry.   Neurological:      Mental Status: He is alert and oriented to person, place, and time.      Motor: Motor function is intact.   Psychiatric:         Mood and Affect: Mood normal.         Behavior: Behavior normal.        Result Review :                  Results for orders placed or performed in visit on 01/05/22   Urine Culture - Urine, Urine, Clean Catch    Specimen: Urine, Clean Catch   Result Value Ref Range    Urine Culture >100,000 CFU/mL Staphylococcus, coagulase negative (A)    Bladder Scan   Result Value Ref Range    Urine Volume 838    POC Urinalysis Dipstick, Automated    Specimen: Urine   Result Value Ref Range    Color Yellow Yellow, Straw, Dark Yellow, Bren    Clarity, UA Clear Clear    Specific Gravity  1.010 1.005 - 1.030    pH, Urine 5.5 5.0 - 8.0    Leukocytes Large (3+) (A) Negative    Nitrite, UA Positive (A) Negative    Protein, POC Negative Negative mg/dL    Glucose, UA Negative Negative, 1000 mg/dL (3+) mg/dL    Ketones, UA Negative Negative    Urobilinogen, UA Normal Normal    Bilirubin Negative Negative    Blood, UA Small (A) Negative    Lot Number [id number]     Expiration Date 2,022/12        Assessment and Plan    Diagnoses and all orders for this visit:    1. Urinary retention " (Primary)  -     POC Urinalysis Dipstick, Automated  -     Bladder Scan  -     Urine Culture - Urine, Urine, Clean Catch; Future  -     Urine Culture - Urine, Urine, Clean Catch    2. BPH with obstruction/lower urinary tract symptoms    Continue flomax.  Follow up in 1 month. patient will need to self-cath 4 X day/ 14 FR coude due to inability to pass straight cath.           Follow Up   No follow-ups on file.  Patient was given instructions and counseling regarding his condition or for health maintenance advice. Please see specific information pulled into the AVS if appropriate.     Transcribed from ambient dictation for Shynan Lin MD by Aline Marshall  01/05/22   17:43 EST    Patient verbalized consent to the visit recording.  I have personally performed the services described in this document as transcribed by the above individual, and it is both accurate and complete.  Shyann Lin MD  1/9/2022  22:33 EST

## 2022-01-06 LAB — BACTERIA SPEC AEROBE CULT: ABNORMAL

## 2022-01-10 ENCOUNTER — TELEPHONE (OUTPATIENT)
Dept: UROLOGY | Facility: CLINIC | Age: 82
End: 2022-01-10

## 2022-01-10 NOTE — TELEPHONE ENCOUNTER
Patient said he is almost out of catheters.  He said he isn't sure if they were being called to his pharmacy, or getting sent to him.  He hasn't gotten them.

## 2022-01-11 NOTE — TELEPHONE ENCOUNTER
Spoke with patient, catheter order has been faxed twice and order should be delivered in 1 - 2 days. If he needs catheters before he runs out, he needs to come by the office and we will get him some.

## 2022-01-17 ENCOUNTER — OFFICE VISIT (OUTPATIENT)
Dept: UROLOGY | Facility: CLINIC | Age: 82
End: 2022-01-17

## 2022-01-17 VITALS
SYSTOLIC BLOOD PRESSURE: 147 MMHG | HEIGHT: 72 IN | WEIGHT: 141.6 LBS | DIASTOLIC BLOOD PRESSURE: 78 MMHG | BODY MASS INDEX: 19.18 KG/M2

## 2022-01-17 DIAGNOSIS — R33.9 URINARY RETENTION: Primary | ICD-10-CM

## 2022-01-17 LAB — SPECIMEN VOL SMN: NORMAL ML

## 2022-01-17 PROCEDURE — 99212 OFFICE O/P EST SF 10 MIN: CPT | Performed by: UROLOGY

## 2022-01-17 PROCEDURE — 51798 US URINE CAPACITY MEASURE: CPT | Performed by: UROLOGY

## 2022-01-17 NOTE — PROGRESS NOTES
"Chief Complaint  Follow-up (retention, BPH LUTS)    Subjective          Tripp Younger presents to [facility] UROLOGY  History of Present Illness    Verbal consent obtained for recording.    Mr. Younger is here for follow-up for urinary retention. He was taught how to straight cath at home and was straight cathing 4 times a day. He prefers a straight catheter. At his last visit, his urine culture was positive.    He reports that he never received the catheters. He states that the catheter has been rough on him, he reports that it is sore with insertion. He reports that it will make him bleed at times. He reports that he purchases catheters at the drug store. He reports that he does not have any soreness. He reports that when he was cathing 2 times per day he was getting out a lot of urine.     Objective   Vital Signs:   /78   Ht 182.9 cm (72\")   Wt 64.2 kg (141 lb 9.6 oz)   BMI 19.20 kg/m²     Physical Exam  Vitals and nursing note reviewed.   Constitutional:       Appearance: Normal appearance. He is well-developed.   Pulmonary:      Effort: Pulmonary effort is normal.      Breath sounds: Normal air entry.   Neurological:      Mental Status: He is alert and oriented to person, place, and time.      Motor: Motor function is intact.   Psychiatric:         Mood and Affect: Mood normal.         Behavior: Behavior normal.        Result Review :                  Results for orders placed or performed in visit on 01/17/22   Bladder Scan   Result Value Ref Range    Volume: 285ml      Bladder Scan interpretation 01/17/2022    Estimation of residual urine via BVI 3000 Verathon Bladder Scan  Residual Urine: 285 ml  Indication: Urinary retention   Position: Supine  Examination: Incremental scanning of the suprapubic area using 2.0 MHz transducer using copious amounts of acoustic gel.   Findings: An anechoic area was demonstrated which represented the bladder, with measurement of residual urine as " noted. I inspected this myself. In that the residual urine was stable or insignificant, refer to plan for treatment and plan necessary at this time.         Assessment and Plan    Diagnoses and all orders for this visit:    1. Urinary retention (Primary)  -     Cancel: POC Urinalysis Dipstick, Automated  -     Bladder Scan    Urinary retention/BPH  - We will see him back in 3 months.   - He is going to continue to cath 4 times per day now.  - He does have some catheters he bought which he would like to use, so he is going to bring those in to show us the box.        Follow Up   No follow-ups on file.  Patient was given instructions and counseling regarding his condition or for health maintenance advice. Please see specific information pulled into the AVS if appropriate.     Transcribed from ambient dictation for Shyann Lin MD by Cathleen Cabrera.  01/17/22   13:14 EST    Patient verbalized consent to the visit recording.  I have personally performed the services described in this document as transcribed by the above individual, and it is both accurate and complete.  Shyann Lin MD  1/17/2022  17:17 EST

## 2022-07-18 ENCOUNTER — OFFICE VISIT (OUTPATIENT)
Dept: UROLOGY | Facility: CLINIC | Age: 82
End: 2022-07-18

## 2022-07-18 ENCOUNTER — PREP FOR SURGERY (OUTPATIENT)
Dept: OTHER | Facility: HOSPITAL | Age: 82
End: 2022-07-18

## 2022-07-18 VITALS — BODY MASS INDEX: 18.45 KG/M2 | WEIGHT: 136.2 LBS | HEIGHT: 72 IN

## 2022-07-18 DIAGNOSIS — N40.1 BPH WITH OBSTRUCTION/LOWER URINARY TRACT SYMPTOMS: Primary | ICD-10-CM

## 2022-07-18 DIAGNOSIS — N13.8 BPH WITH OBSTRUCTION/LOWER URINARY TRACT SYMPTOMS: Primary | ICD-10-CM

## 2022-07-18 DIAGNOSIS — R33.9 RETENTION OF URINE: ICD-10-CM

## 2022-07-18 PROCEDURE — 99212 OFFICE O/P EST SF 10 MIN: CPT | Performed by: UROLOGY

## 2022-07-18 RX ORDER — SODIUM CHLORIDE 9 MG/ML
100 INJECTION, SOLUTION INTRAVENOUS CONTINUOUS
Status: CANCELLED | OUTPATIENT
Start: 2022-07-18

## 2022-07-18 RX ORDER — LEVOFLOXACIN 5 MG/ML
500 INJECTION, SOLUTION INTRAVENOUS ONCE
Status: CANCELLED | OUTPATIENT
Start: 2022-07-18 | End: 2022-07-18

## 2022-07-18 NOTE — PROGRESS NOTES
"Chief Complaint  Urinary Retention    Subjective          Tripp Younger presents to [facility]OLOG[facility] for followup.     History of Present Illness  Tripp Younger, 1940, presents today for a follow-up.     The patient reports that he is doing well. He states that he could not go at all for a while, but he gets the urge like he used to and is able to urinate a little bit. The patient urinates really well 2 to 3 times a day. He reports that his urine collection is down. The patient rarely gets over 10 mL, most of the time it is 7 or 8 mL. He is cathing 4 times a day.      He would like to try surgery to remove extra tissue in order for him to void better.    A review of systems was completed and positive findings are noted in the HPI.     Objective   Vital Signs:   Ht 182.9 cm (72\")   Wt 61.8 kg (136 lb 3.2 oz)   BMI 18.47 kg/m²     Physical Exam  Vitals and nursing note reviewed.   Constitutional:       Appearance: Normal appearance. He is well-developed.   Pulmonary:      Effort: Pulmonary effort is normal.      Breath sounds: Normal air entry.   Neurological:      Mental Status: He is alert and oriented to person, place, and time.      Motor: Motor function is intact.   Psychiatric:         Mood and Affect: Mood normal.         Behavior: Behavior normal.          Result Review :            Assessment and Plan    Diagnoses and all orders for this visit:    1. 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            Follow Up       No follow-ups on file.  Patient was given instructions and counseling regarding his condition or for health maintenance advice. Please see specific information pulled into the AVS if appropriate.     Transcribed from ambient dictation for Shyann Lin MD by Shannan Ruth.  07/18/22   10:37 " EDT    Patient verbalized consent to the visit recording.

## 2022-07-18 NOTE — H&P
[facility]   Urology HISTORY AND PHYSICAL    Patient Name: Tripp Younger  : 1940  MRN: [id number]  Primary Care Physician:  Gosia Kearney APRN  Date of admission: (Not on file)    Subjective   Subjective       Patient has BPH with obstruction and presents for bipolar transurethral resection of the prostate.      Personal History     Past Medical History:   Diagnosis Date   • Chronic pain    • Depression    • Hormone disorder    • Hypothyroidism        Past Surgical History:   Procedure Laterality Date   • HEMORRHOIDECTOMY     • HERNIA REPAIR          Family History: family history is not on file. Otherwise pertinent FHx was reviewed and not pertinent to current issue.    Social History:  reports that he has never smoked. He has never used smokeless tobacco. He reports that he does not drink alcohol and does not use drugs.    Home Medications:  Cholecalciferol, Latanoprostene Bunod, Mirabegron ER, Multiple Vitamins-Minerals, aspirin, famotidine, ferrous sulfate, finasteride, levothyroxine, meloxicam, niacin, pravastatin, sertraline, and tamsulosin    Allergies:  No Known Allergies    Objective    Objective     Vitals:        Physical Exam  Constitutional:       Appearance: Normal appearance.   Cardiovascular:      Rate and Rhythm: Normal rate and regular rhythm.   Pulmonary:      Effort: Pulmonary effort is normal.      Breath sounds: Normal breath sounds.   Neurological:      Mental Status: He is alert. Mental status is at baseline.   Psychiatric:         Mood and Affect: Mood and affect normal.         Speech: Speech normal.         Judgment: Judgment normal.         Result Review    Result Review:  I have personally reviewed the results from the time of this admission to 2022 10:03 EDT and agree with these findings:  [x]  Laboratory  []  Microbiology  [x]  Radiology  []  EKG/Telemetry   []  Cardiology/Vascular   []  Pathology  [x]  Old records  []  Other:      Assessment & Plan    Assessment / Plan       Active Hospital Problems:  There are no active hospital problems to display for this patient.      Plan: Bipolar transurethral resection of the prostate.  Risks and benefits discussed with patient and they are agreeable to proceed.    DVT prophylaxis:  No DVT prophylaxis order currently exists.    CODE STATUS:           Electronically signed by Shyann Lin MD, 07/18/22, 10:03 AM EDT.

## 2022-07-18 NOTE — ASSESSMENT & PLAN NOTE
- He will call us when he is ready to schedule his bipolar TURP; otherwise, he will continue to cath 4 times a day.

## 2022-10-14 ENCOUNTER — TELEPHONE (OUTPATIENT)
Dept: UROLOGY | Facility: CLINIC | Age: 82
End: 2022-10-14

## 2022-10-14 NOTE — TELEPHONE ENCOUNTER
Spoke with PT and he returned a call from me from yesterday. He said he was doing ok with cathing now and does not want to schedule a TURP procedure at this time. He said he would call us back when he wants to schedule it.

## 2025-06-04 ENCOUNTER — TELEPHONE (OUTPATIENT)
Dept: UROLOGY | Age: 85
End: 2025-06-04
Payer: MEDICARE

## 2025-06-04 NOTE — TELEPHONE ENCOUNTER
ZABRINA CALLED FROM [facility]. PATIENT HAS  1.5 CM BLADDER MASS.  RENAL US WITH POST-VOID ORDERED BY HIS NEPHROLOGIST WAS DONE AT [place].  SHE IS FAXING RECORDS.  REQUESTS APPOINTMENT.     PATIENT IS ESTABLISHED WITH DR. LAMB.    CB#[phone]

## 2025-06-05 ENCOUNTER — TELEPHONE (OUTPATIENT)
Dept: UROLOGY | Age: 85
End: 2025-06-05
Payer: MEDICARE

## 2025-06-05 ENCOUNTER — PREP FOR SURGERY (OUTPATIENT)
Dept: OTHER | Facility: HOSPITAL | Age: 85
End: 2025-06-05
Payer: MEDICARE

## 2025-06-05 DIAGNOSIS — R31.0 GROSS HEMATURIA: Primary | ICD-10-CM

## 2025-06-05 DIAGNOSIS — N32.89 BLADDER MASS: ICD-10-CM

## 2025-06-05 RX ORDER — SODIUM CHLORIDE 0.9 % (FLUSH) 0.9 %
10 SYRINGE (ML) INJECTION EVERY 12 HOURS SCHEDULED
OUTPATIENT
Start: 2025-06-05

## 2025-06-05 RX ORDER — SODIUM CHLORIDE 0.9 % (FLUSH) 0.9 %
10 SYRINGE (ML) INJECTION AS NEEDED
OUTPATIENT
Start: 2025-06-05

## 2025-06-05 RX ORDER — SODIUM CHLORIDE 9 MG/ML
40 INJECTION, SOLUTION INTRAVENOUS AS NEEDED
OUTPATIENT
Start: 2025-06-05

## 2025-06-05 NOTE — H&P (VIEW-ONLY)
[facility]   Urology HISTORY AND PHYSICAL    Patient Name: Tripp Younger  : 1940  MRN: [id number]  Primary Care Physician:  Rene Lozoya MD  Date of admission: (Not on file)    Subjective   Subjective     History of Present Illness  Patient has hematuria and possible bladder mass and presents for cystoscopy and possible transurethral resection of bladder tumor      Personal History     Past Medical History:   Diagnosis Date    Chronic pain     Depression     Hormone disorder     Hypothyroidism        Past Surgical History:   Procedure Laterality Date    HEMORRHOIDECTOMY      HERNIA REPAIR         Family History: family history is not on file. Otherwise pertinent FHx was reviewed and not pertinent to current issue.    Social History:  reports that he has never smoked. He has never used smokeless tobacco. He reports that he does not drink alcohol and does not use drugs.    Home Medications:  Cholecalciferol, Latanoprostene Bunod, Mirabegron ER, Multiple Vitamins-Minerals, aspirin, levothyroxine, niacin, pravastatin, and sertraline    Allergies:  No Known Allergies    Objective    Objective     Vitals:   BP: ()/()   Arterial Line BP: ()/()     Physical Exam  Constitutional:       Appearance: Normal appearance.   Cardiovascular:      Rate and Rhythm: Normal rate and regular rhythm.   Pulmonary:      Effort: Pulmonary effort is normal.      Breath sounds: Normal breath sounds.   Neurological:      Mental Status: He is alert. Mental status is at baseline.   Psychiatric:         Mood and Affect: Mood and affect normal.         Speech: Speech normal.         Judgment: Judgment normal.         Result Review    Result Review:  I have personally reviewed the results from the time of this admission to 2025 08:39 EDT and agree with these findings:  [x]  Laboratory  []  Microbiology  [x]  Radiology  []  EKG/Telemetry   []  Cardiology/Vascular   []  Pathology  []  Old records  []  Other:      Assessment & Plan    Assessment / Plan       Active Hospital Problems:  There are no active hospital problems to display for this patient.      Plan: cystoscopy and possible transurethral resection of bladder tumor  Risks and benefits discussed with patient and they are agreeable to proceed.    VTE Prophylaxis:  No VTE prophylaxis order currently exists.        CODE STATUS:           Electronically signed by Shyann Lin MD, 06/05/25, 8:39 AM EDT.

## 2025-06-05 NOTE — H&P
[facility]   Urology HISTORY AND PHYSICAL    Patient Name: Tripp Younger  : 1940  MRN: [id number]  Primary Care Physician:  Rene Lozoya MD  Date of admission: (Not on file)    Subjective   Subjective     History of Present Illness  Patient has hematuria and possible bladder mass and presents for cystoscopy and possible transurethral resection of bladder tumor      Personal History     Past Medical History:   Diagnosis Date    Chronic pain     Depression     Hormone disorder     Hypothyroidism        Past Surgical History:   Procedure Laterality Date    HEMORRHOIDECTOMY      HERNIA REPAIR         Family History: family history is not on file. Otherwise pertinent FHx was reviewed and not pertinent to current issue.    Social History:  reports that he has never smoked. He has never used smokeless tobacco. He reports that he does not drink alcohol and does not use drugs.    Home Medications:  Cholecalciferol, Latanoprostene Bunod, Mirabegron ER, Multiple Vitamins-Minerals, aspirin, levothyroxine, niacin, pravastatin, and sertraline    Allergies:  No Known Allergies    Objective    Objective     Vitals:   BP: ()/()   Arterial Line BP: ()/()     Physical Exam  Constitutional:       Appearance: Normal appearance.   Cardiovascular:      Rate and Rhythm: Normal rate and regular rhythm.   Pulmonary:      Effort: Pulmonary effort is normal.      Breath sounds: Normal breath sounds.   Neurological:      Mental Status: He is alert. Mental status is at baseline.   Psychiatric:         Mood and Affect: Mood and affect normal.         Speech: Speech normal.         Judgment: Judgment normal.         Result Review    Result Review:  I have personally reviewed the results from the time of this admission to 2025 08:39 EDT and agree with these findings:  [x]  Laboratory  []  Microbiology  [x]  Radiology  []  EKG/Telemetry   []  Cardiology/Vascular   []  Pathology  []  Old records  []  Other:      Assessment & Plan    Assessment / Plan       Active Hospital Problems:  There are no active hospital problems to display for this patient.      Plan: cystoscopy and possible transurethral resection of bladder tumor  Risks and benefits discussed with patient and they are agreeable to proceed.    VTE Prophylaxis:  No VTE prophylaxis order currently exists.        CODE STATUS:           Electronically signed by Shyann Lin MD, 06/05/25, 8:39 AM EDT.

## 2025-06-05 NOTE — TELEPHONE ENCOUNTER
Spoke to patient and scheduled procedure for 7/1/25. I went over preop instructions and informed that I would be mailing the information. I informed patient to hold aspirin for 5 days prior to procedure. Patient voiced understanding.

## 2025-06-30 ENCOUNTER — ANESTHESIA EVENT (OUTPATIENT)
Dept: PERIOP | Facility: HOSPITAL | Age: 85
End: 2025-06-30
Payer: MEDICARE

## 2025-06-30 RX ORDER — MIRTAZAPINE 7.5 MG/1
7.5 TABLET, FILM COATED ORAL
COMMUNITY
Start: 2025-03-04

## 2025-06-30 RX ORDER — DAPAGLIFLOZIN 10 MG/1
10 TABLET, FILM COATED ORAL DAILY
COMMUNITY

## 2025-06-30 RX ORDER — FEXOFENADINE HCL 180 MG/1
180 TABLET ORAL DAILY
COMMUNITY
Start: 2024-10-09 | End: 2025-10-10

## 2025-06-30 RX ORDER — LATANOPROST 50 UG/ML
1 SOLUTION/ DROPS OPHTHALMIC NIGHTLY
COMMUNITY
Start: 2025-05-05

## 2025-06-30 RX ORDER — OMEPRAZOLE 40 MG/1
1 CAPSULE, DELAYED RELEASE ORAL DAILY
COMMUNITY
Start: 2025-05-22

## 2025-06-30 RX ORDER — CINACALCET 30 MG/1
30 TABLET, FILM COATED ORAL DAILY
COMMUNITY
Start: 2025-05-21 | End: 2025-08-20

## 2025-06-30 NOTE — PRE-PROCEDURE INSTRUCTIONS
PATIENT INSTRUCTED TO BE:    - NOTHING TO EAT AFTER MIDNIGHT OR CHEW, EXCEPT CAN HAVE CLEAR LIQUIDS 2 HOURS PRIOR TO SURGERY ARRIVAL TIME , NO MORE THAN 8 OZ. (NOTHING RED)     - TO HOLD ALL VITAMINS, SUPPLEMENTS, NSAIDS FOR ONE WEEK PRIOR TO THEIR SURGICAL PROCEDURE  SHOWER AM OF SURGERY   INSTRUCTED NO LOTIONS, JEWELRY, PIERCINGS,  NAIL POLISH, OR DEODORANT DAY OF SURGERY    -INSTRUCTED TO TAKE THE FOLLOWING MEDICATIONS THE DAY OF SURGERY WITH SIPS OF WATER: ALLEGRA, OMEPRAZOLE    - DO NOT BRING ANY MEDICATIONS WITH YOU TO THE HOSPITAL THE DAY OF SURGERY, EXCEPT IF USE INHALERS. BRING INHALERS DAY OF SURGERY       - BRING CPAP OR BIPAP TO THE HOSPITAL ONLY IF YOU ARE SPENDING THE NIGHT    - DO NOT SMOKE OR VAPE 24 HOURS PRIOR TO PROCEDURE PER ANESTHESIA REQUEST     -MAKE SURE YOU HAVE A RIDE HOME OR SOMEONE TO STAY WITH YOU THE DAY OF THE PROCEDURE AFTER YOU GO HOME     - FOLLOW ANY OTHER INSTRUCTIONS GIVEN TO YOU BY YOUR SURGEON'S OFFICE.     DAY OF SURGERY 7/1/25_ AT [facility] ( [address]), YOU CAN  PARK OR SELF PARK. ENTER THE PAVILION THRU MAIN ENTRANCE, TAKE ELEVATORS TO THE FIRST FLOOR, CHECK IN AT THE DESK FOR REGISTRATION/ SURGERY.     - YOU WILL RECEIVE A PHONE CALL THE DAY PRIOR TO SURGERY BETWEEN 1PM AND 4 PM WITH ARRIVAL TIME, IF YOUR SURGERY IS ON A MONDAY YOU WILL RECEIVE A CALL THE FRIDAY PRIOR TO SURGERY DATE    - BRING CASH OR CREDIT CARD FOR COPAYMENT OF MEDICATIONS AFTER SURGERY IF YOU USE THE HOSPITAL PHARMACY (MEDS TO BED)    - PREADMISSION TESTING NURSE LISA Stanton [phone]  IF HAVE ANY QUESTIONS     -PATIENT PROVIDED THE NUMBER FOR PREOP SURGICAL DEPT IF HAD QUESTIONS AFTER HOURS PRIOR TO SURGERY ([phone]).  INFORMED PT IF NO ANSWER, LEAVE A MESSAGE AND SOMEONE WILL RETURN THEIR CALL       PATIENT VERBALIZED UNDERSTANDING       Clear Liquid Diet        Find out when you need to start a clear liquid diet.   Think of “clear liquids” as anything  you could read a newspaper through. This includes things like water, broth, sports drinks, or tea WITHOUT any kind of milk or cream.           Once you are told to start a clear liquid diet, only drink these things until 2 hours before arrival to the hospital or when the hospital says to stop. Total volume limitation: 8 oz.       Clear liquids you CAN drink:   Water   Clear broth: beef, chicken, vegetable, or bone broth with nothing in it   Gatorade   Lemonade or Erlin-aid   Soda   Tea, coffee (NO cream or honey)   Jell-O (without fruit)   Popsicles (without fruit or cream)   Italian ices   Juice without pulp: apple, white, grape   You may use salt, pepper, and sugar  NO RED  NO NOODLES    Do NOT drink:   Milk or cream   Soy milk, almond milk, coconut milk, or other non-dairy drinks and   creamers   Milkshakes or smoothies   Tomato juice   Orange juice   Grapefruit juice   Cream soups or any other than broth         Clear Liquid Diet:  Do NOT eat any solid food.  Do NOT eat or suck on mints or candy.  Do NOT chew gum.  Do NOT drink thick liquids like milk or juice with pulp in it.  Do NOT add milk, cream, or anything like soy milk or almond milk to coffee or tea.

## 2025-07-01 ENCOUNTER — HOSPITAL ENCOUNTER (OUTPATIENT)
Facility: HOSPITAL | Age: 85
Setting detail: HOSPITAL OUTPATIENT SURGERY
Discharge: HOME | End: 2025-07-01
Attending: UROLOGY | Admitting: UROLOGY
Payer: MEDICARE

## 2025-07-01 ENCOUNTER — ANESTHESIA (OUTPATIENT)
Dept: PERIOP | Facility: HOSPITAL | Age: 85
End: 2025-07-01
Payer: MEDICARE

## 2025-07-01 VITALS
SYSTOLIC BLOOD PRESSURE: 153 MMHG | RESPIRATION RATE: 14 BRPM | HEIGHT: 72 IN | BODY MASS INDEX: 17.53 KG/M2 | DIASTOLIC BLOOD PRESSURE: 72 MMHG | HEART RATE: 48 BPM | TEMPERATURE: 97.6 F | OXYGEN SATURATION: 100 % | WEIGHT: 129.41 LBS

## 2025-07-01 DIAGNOSIS — N32.89 BLADDER MASS: ICD-10-CM

## 2025-07-01 DIAGNOSIS — R31.0 GROSS HEMATURIA: ICD-10-CM

## 2025-07-01 PROCEDURE — 25010000002 SUGAMMADEX 200 MG/2ML SOLUTION

## 2025-07-01 PROCEDURE — 25010000002 LIDOCAINE PF 2% 2 % SOLUTION

## 2025-07-01 PROCEDURE — 25010000002 FENTANYL CITRATE (PF) 50 MCG/ML SOLUTION

## 2025-07-01 PROCEDURE — 25010000002 MIDAZOLAM PER 1MG: Performed by: ANESTHESIOLOGY

## 2025-07-01 PROCEDURE — 52317 REMOVE BLADDER STONE: CPT | Performed by: UROLOGY

## 2025-07-01 PROCEDURE — 25010000002 PROPOFOL 10 MG/ML EMULSION

## 2025-07-01 PROCEDURE — 52601 PROSTATECTOMY (TURP): CPT | Performed by: UROLOGY

## 2025-07-01 PROCEDURE — 25010000002 ONDANSETRON PER 1 MG

## 2025-07-01 PROCEDURE — 25010000002 DEXAMETHASONE PER 1 MG

## 2025-07-01 PROCEDURE — 88305 TISSUE EXAM BY PATHOLOGIST: CPT | Performed by: UROLOGY

## 2025-07-01 PROCEDURE — 25010000002 CEFAZOLIN PER 500 MG: Performed by: UROLOGY

## 2025-07-01 PROCEDURE — 25810000003 LACTATED RINGERS PER 1000 ML: Performed by: ANESTHESIOLOGY

## 2025-07-01 RX ORDER — MAGNESIUM HYDROXIDE 1200 MG/15ML
LIQUID ORAL AS NEEDED
Status: DISCONTINUED | OUTPATIENT
Start: 2025-07-01 | End: 2025-07-01 | Stop reason: HOSPADM

## 2025-07-01 RX ORDER — IBUPROFEN 600 MG/1
600 TABLET, FILM COATED ORAL EVERY 6 HOURS PRN
Status: DISCONTINUED | OUTPATIENT
Start: 2025-07-01 | End: 2025-07-01 | Stop reason: HOSPADM

## 2025-07-01 RX ORDER — MIDAZOLAM HYDROCHLORIDE 2 MG/2ML
0.5 INJECTION, SOLUTION INTRAMUSCULAR; INTRAVENOUS ONCE
Status: COMPLETED | OUTPATIENT
Start: 2025-07-01 | End: 2025-07-01

## 2025-07-01 RX ORDER — FENTANYL CITRATE 50 UG/ML
INJECTION, SOLUTION INTRAMUSCULAR; INTRAVENOUS AS NEEDED
Status: DISCONTINUED | OUTPATIENT
Start: 2025-07-01 | End: 2025-07-01 | Stop reason: SURG

## 2025-07-01 RX ORDER — SODIUM CHLORIDE, SODIUM LACTATE, POTASSIUM CHLORIDE, CALCIUM CHLORIDE 600; 310; 30; 20 MG/100ML; MG/100ML; MG/100ML; MG/100ML
9 INJECTION, SOLUTION INTRAVENOUS CONTINUOUS PRN
Status: DISCONTINUED | OUTPATIENT
Start: 2025-07-01 | End: 2025-07-01 | Stop reason: HOSPADM

## 2025-07-01 RX ORDER — SODIUM CHLORIDE 0.9 % (FLUSH) 0.9 %
10 SYRINGE (ML) INJECTION AS NEEDED
Status: DISCONTINUED | OUTPATIENT
Start: 2025-07-01 | End: 2025-07-01 | Stop reason: HOSPADM

## 2025-07-01 RX ORDER — EPHEDRINE SULFATE 50 MG/ML
INJECTION INTRAVENOUS AS NEEDED
Status: DISCONTINUED | OUTPATIENT
Start: 2025-07-01 | End: 2025-07-01 | Stop reason: SURG

## 2025-07-01 RX ORDER — ONDANSETRON 2 MG/ML
INJECTION INTRAMUSCULAR; INTRAVENOUS AS NEEDED
Status: DISCONTINUED | OUTPATIENT
Start: 2025-07-01 | End: 2025-07-01 | Stop reason: SURG

## 2025-07-01 RX ORDER — SODIUM CHLORIDE 9 MG/ML
40 INJECTION, SOLUTION INTRAVENOUS AS NEEDED
Status: DISCONTINUED | OUTPATIENT
Start: 2025-07-01 | End: 2025-07-01 | Stop reason: HOSPADM

## 2025-07-01 RX ORDER — SODIUM CHLORIDE 0.9 % (FLUSH) 0.9 %
10 SYRINGE (ML) INJECTION EVERY 12 HOURS SCHEDULED
Status: DISCONTINUED | OUTPATIENT
Start: 2025-07-01 | End: 2025-07-01 | Stop reason: HOSPADM

## 2025-07-01 RX ORDER — ONDANSETRON 2 MG/ML
4 INJECTION INTRAMUSCULAR; INTRAVENOUS ONCE AS NEEDED
Status: DISCONTINUED | OUTPATIENT
Start: 2025-07-01 | End: 2025-07-01 | Stop reason: HOSPADM

## 2025-07-01 RX ORDER — PROMETHAZINE HYDROCHLORIDE 25 MG/1
25 TABLET ORAL ONCE AS NEEDED
Status: DISCONTINUED | OUTPATIENT
Start: 2025-07-01 | End: 2025-07-01 | Stop reason: HOSPADM

## 2025-07-01 RX ORDER — PROMETHAZINE HYDROCHLORIDE 12.5 MG/1
12.5 TABLET ORAL ONCE AS NEEDED
Status: DISCONTINUED | OUTPATIENT
Start: 2025-07-01 | End: 2025-07-01 | Stop reason: HOSPADM

## 2025-07-01 RX ORDER — ACETAMINOPHEN 500 MG
1000 TABLET ORAL ONCE
Status: COMPLETED | OUTPATIENT
Start: 2025-07-01 | End: 2025-07-01

## 2025-07-01 RX ORDER — OXYCODONE HYDROCHLORIDE 5 MG/1
5 TABLET ORAL
Status: DISCONTINUED | OUTPATIENT
Start: 2025-07-01 | End: 2025-07-01 | Stop reason: HOSPADM

## 2025-07-01 RX ORDER — DEXAMETHASONE SODIUM PHOSPHATE 4 MG/ML
INJECTION, SOLUTION INTRA-ARTICULAR; INTRALESIONAL; INTRAMUSCULAR; INTRAVENOUS; SOFT TISSUE AS NEEDED
Status: DISCONTINUED | OUTPATIENT
Start: 2025-07-01 | End: 2025-07-01 | Stop reason: SURG

## 2025-07-01 RX ORDER — ROCURONIUM BROMIDE 10 MG/ML
INJECTION, SOLUTION INTRAVENOUS AS NEEDED
Status: DISCONTINUED | OUTPATIENT
Start: 2025-07-01 | End: 2025-07-01 | Stop reason: SURG

## 2025-07-01 RX ORDER — PROMETHAZINE HYDROCHLORIDE 25 MG/1
25 SUPPOSITORY RECTAL ONCE AS NEEDED
Status: DISCONTINUED | OUTPATIENT
Start: 2025-07-01 | End: 2025-07-01 | Stop reason: HOSPADM

## 2025-07-01 RX ORDER — ACETAMINOPHEN 325 MG/1
650 TABLET ORAL ONCE
Status: DISCONTINUED | OUTPATIENT
Start: 2025-07-01 | End: 2025-07-01

## 2025-07-01 RX ORDER — LIDOCAINE HYDROCHLORIDE 20 MG/ML
INJECTION, SOLUTION EPIDURAL; INFILTRATION; INTRACAUDAL; PERINEURAL AS NEEDED
Status: DISCONTINUED | OUTPATIENT
Start: 2025-07-01 | End: 2025-07-01 | Stop reason: SURG

## 2025-07-01 RX ORDER — PROPOFOL 10 MG/ML
VIAL (ML) INTRAVENOUS AS NEEDED
Status: DISCONTINUED | OUTPATIENT
Start: 2025-07-01 | End: 2025-07-01 | Stop reason: SURG

## 2025-07-01 RX ADMIN — ONDANSETRON 4 MG: 2 INJECTION INTRAMUSCULAR; INTRAVENOUS at 08:30

## 2025-07-01 RX ADMIN — SODIUM CHLORIDE, POTASSIUM CHLORIDE, SODIUM LACTATE AND CALCIUM CHLORIDE 9 ML/HR: 600; 310; 30; 20 INJECTION, SOLUTION INTRAVENOUS at 07:08

## 2025-07-01 RX ADMIN — SODIUM CHLORIDE, POTASSIUM CHLORIDE, SODIUM LACTATE AND CALCIUM CHLORIDE: 600; 310; 30; 20 INJECTION, SOLUTION INTRAVENOUS at 09:09

## 2025-07-01 RX ADMIN — SODIUM CHLORIDE 2000 MG: 9 INJECTION, SOLUTION INTRAVENOUS at 08:19

## 2025-07-01 RX ADMIN — PROPOFOL 120 MG: 10 INJECTION, EMULSION INTRAVENOUS at 08:14

## 2025-07-01 RX ADMIN — ROCURONIUM BROMIDE 50 MG: 10 INJECTION, SOLUTION INTRAVENOUS at 08:14

## 2025-07-01 RX ADMIN — SUGAMMADEX 200 MG: 100 INJECTION, SOLUTION INTRAVENOUS at 09:03

## 2025-07-01 RX ADMIN — LIDOCAINE HYDROCHLORIDE 50 MG: 20 INJECTION, SOLUTION INTRAVENOUS at 08:14

## 2025-07-01 RX ADMIN — FENTANYL CITRATE 50 MCG: 50 INJECTION, SOLUTION INTRAMUSCULAR; INTRAVENOUS at 08:14

## 2025-07-01 RX ADMIN — DEXAMETHASONE SODIUM PHOSPHATE 8 MG: 4 INJECTION, SOLUTION INTRAMUSCULAR; INTRAVENOUS at 08:19

## 2025-07-01 RX ADMIN — ACETAMINOPHEN 1000 MG: 500 TABLET ORAL at 07:07

## 2025-07-01 RX ADMIN — MIDAZOLAM HYDROCHLORIDE 0.5 MG: 1 INJECTION, SOLUTION INTRAMUSCULAR; INTRAVENOUS at 07:50

## 2025-07-01 RX ADMIN — EPHEDRINE SULFATE 5 MG: 50 INJECTION INTRAVENOUS at 08:49

## 2025-07-01 NOTE — ANESTHESIA PREPROCEDURE EVALUATION
Anesthesia Evaluation     Patient summary reviewed and Nursing notes reviewed   no history of anesthetic complications:   NPO Solid Status: > 8 hours  NPO Liquid Status: > 2 hours           Airway   Mallampati: II  Dental      Pulmonary - negative pulmonary ROS   Cardiovascular - negative cardio ROS  Exercise tolerance: good (4-7 METS)        Neuro/Psych  (+) psychiatric history Depression  GI/Hepatic/Renal/Endo    (+) thyroid problem hypothyroidism    Musculoskeletal     Abdominal    Substance History - negative use     OB/GYN negative ob/gyn ROS         Other   arthritis (chronic pain),     ROS/Med Hx Other: PAT Nursing Notes unavailable.                     Anesthesia Plan    ASA 2     general     (Patient understands anesthesia not responsible for dental damage.)  intravenous induction     Anesthetic plan, risks, benefits, and alternatives have been provided, discussed and informed consent has been obtained with: patient.  Pre-procedure education provided  Plan discussed with CRNA.        CODE STATUS:

## 2025-07-01 NOTE — DISCHARGE INSTRUCTIONS
DISCHARGE INSTRUCTIONS   TRANSURETHRAL RESECTION   OF BLADDER TUMOR            For your surgery you had:  [] General anesthesia (you may have a sore throat for the first 24 hours)    You may experience dizziness, drowsiness, or lightheadedness for several hours following surgery.  Do not stay alone today or tonight.  Limit your activity for 24 hours.   You should not drive, operate machinery, drink alcohol, or sign legally binding documents for 24 hours or while you are taking pain medication.   Resume your diet slowly. Follow any special dietary instructions you may have been given by your doctor.         NOTIFY YOUR DOCTOR IF YOU EXPERIENCE ANY OF THE FOLLOWING:  Temperature greater than 101 degrees Fahrenheit  Shaking chills  Nausea, vomiting, and/or pain that is not controlled by prescribed medications  Increase in bleeding or bleeding that is excessive  If unable to reach your doctor, please go to the closest Emergency Room   You will have a catheter to drain your bladder. Catheters are usually removed in 24-48 hours. Wash around the catheter with soap and water and rinse well.   Drink 6 glasses of water a day for 3-4 days.   You may see blood in your urine for up to a week, there may be small blood clots. If so, increase the amount you are drinking.   If you are passing large clots, call your doctor.   Avoid lifting or straining until released by MD.  You may have burning, pain, and frequency of urination for 48 hours after catheter is removed. Call your doctor if it continues longer.   Medications per physician as indicated on discharge medication information sheet.     [x] Last dose of pain medication given at:     TYLENOL 1000 MG AT 7:07 AM    SPECIAL INSTRUCTIONS:     PLEASE FOLLOW ALL WRITTEN AND VERBAL INSTRUCTIONS OF DR LAMB

## 2025-07-01 NOTE — OP NOTE
CYSTOSCOPY TRANSURETHRAL RESECTION OF BLADDER TUMOR  Procedure Report    Patient Name:  Tripp Younger  YOB: 1940    Date of Surgery:  7/1/2025     Pre-op Diagnosis:   Gross hematuria [R31.0]  Bladder mass [N32.89]       Post-Op Diagnosis Codes:     * Gross hematuria [R31.0]     * Bladder mass [N32.89]      Procedure/CPT® Codes:    Procedure(s):  CYSTOSCOPY TRANSURETHRAL RESECTION OF PROSTATE  Cystolithopaxy, stone 2 cm in size      Staff:  Surgeon(s):  Shyann Lin MD         Anesthesia: General    Estimated Blood Loss: 40 mL    Implants:    Nothing was implanted during the procedure    Specimen:          Specimens       ID Source Type Tests Collected By Collected At Frozen?    A Prostate Tissue TISSUE PATHOLOGY EXAM   Shyann Lin MD 7/1/25 0901     Description: PROSTATE CHIPS                Complications: None    Description of Procedure:     After proper consent was obtained, patient was taken to operating room and MAC anesthesia was performed.  The patient was placed in the dorsal lithotomy position and prepped and draped in the normal sterile fashion for cystoscopy.      A 22 Upper sorbian rigid cystoscope was inserted into the bladder.  The bladder was inspected in a systemic meridian fashion using a 30 degree lens.  There were no tumors, lesions, stones or other abnormalities seen send for diffuse trabeculations and diverticuli.  No bladder tumors were seen.  Patient did have a 2 cm bladder stone as well.  There was a large obstructing median lobe protruding into the bladder which was likely the mass seen on CT scan..  Both ureteral orifices were normal in appearance.      A bipolar transurethral resection of the prostate median lobe was then performed using a medium loop.  Care was taken not to injure the bladder neck.  At the end of the resection there hemostasis.  All bleeding was controlled with electrocautery.  Prostate chips were removed and sent to pathology labeled as prostate  chips.  I used a hand-held lithotrite to then crush the stone up and remove it.  The stone was approximately 2 cm in size.      A de la o catheter was then placed.       The patient tolerated the procedure well and was transferred to the PACU in stable condition.        Shyann Lin MD     Date: 7/1/2025  Time: 09:18 EDT

## 2025-07-01 NOTE — ANESTHESIA POSTPROCEDURE EVALUATION
Patient: Tripp Younger    Procedure Summary       Date: 07/01/25 Room / Location: [facility] OR  / [facility] MAIN OR    Anesthesia Start: 0811 Anesthesia Stop: 0912    Procedure: CYSTOSCOPY TRANSURETHRAL RESECTION OF BLADDER TUMOR Diagnosis:       Gross hematuria      Bladder mass      (Gross hematuria [R31.0])      (Bladder mass [N32.89])    Surgeons: Shyann Lin MD Provider: Guanako Lou MD    Anesthesia Type: general ASA Status: 2            Anesthesia Type: general    Vitals  Vitals Value Taken Time   /62 07/01/25 09:50   Temp 36.4 °C (97.6 °F) 07/01/25 09:45   Pulse 52 07/01/25 09:50   Resp 14 07/01/25 09:50   SpO2 100 % 07/01/25 09:50           Post Anesthesia Care and Evaluation    Patient location during evaluation: bedside  Patient participation: complete - patient participated  Level of consciousness: awake and alert  Pain management: adequate    Airway patency: patent  Anesthetic complications: No anesthetic complications  PONV Status: none  Cardiovascular status: acceptable  Respiratory status: acceptable  Hydration status: acceptable    Comments: An Anesthesiologist personally participated in the most demanding procedures (including induction and emergence if applicable) in the anesthesia plan, monitored the course of anesthesia administration at frequent intervals and remained physically present and available for immediate diagnosis and treatment of emergencies.

## 2025-07-02 LAB
CYTO UR: NORMAL
LAB AP CASE REPORT: NORMAL
LAB AP CLINICAL INFORMATION: NORMAL
PATH REPORT.FINAL DX SPEC: NORMAL
PATH REPORT.GROSS SPEC: NORMAL

## 2025-07-08 ENCOUNTER — CLINICAL SUPPORT (OUTPATIENT)
Dept: UROLOGY | Age: 85
End: 2025-07-08
Payer: MEDICARE

## 2025-07-08 DIAGNOSIS — R33.9 URINARY RETENTION: Primary | ICD-10-CM

## 2025-07-08 LAB — URINE VOLUME: NORMAL

## 2025-07-08 NOTE — PROGRESS NOTES
Patient in supine position.  Detached urinary drain bag from catheter.  Instilled 200 ml of sterile water into the bladder.  Deflated balloon and removed catheter.  Patient tolerated well.  Voided 150 ml. PVR showed 173 ml. Patient is going to come back at 12:30 for another bladder scan.    Bladder Scan interpretation 07/08/2025    Estimation of residual urine via BVI 3000 Verathon Bladder Scan  MA/nurse performing: Jeronimo Montana RN  Residual Urine: 173 ml  Indication: Urinary retention   Position: Supine  Examination: Incremental scanning of the suprapubic area using 2.0 MHz transducer using copious amounts of acoustic gel.   Findings: An anechoic area was demonstrated which represented the bladder, with measurement of residual urine as noted. I inspected this myself. In that the residual urine was stable or insignificant, refer to plan for treatment and plan necessary at this time.

## 2025-07-08 NOTE — PROGRESS NOTES
Patient came back in for PVR. 195 ml per scan. Patient does cath at home. I spoke with ALEXUS Flowers and instructed patient to cath in the morning and at night until appointment with Dr. Lin on Friday and then we will reevaluate. Patient also instructed to cath PRN if unable to pee. Patient voiced understanding.

## 2025-07-11 ENCOUNTER — OFFICE VISIT (OUTPATIENT)
Dept: UROLOGY | Age: 85
End: 2025-07-11
Payer: MEDICARE

## 2025-07-11 VITALS — BODY MASS INDEX: 17.47 KG/M2 | WEIGHT: 129 LBS | HEIGHT: 72 IN

## 2025-07-11 DIAGNOSIS — R33.9 URINARY RETENTION: Primary | ICD-10-CM

## 2025-07-11 LAB
BILIRUB BLD-MCNC: NEGATIVE MG/DL
CLARITY, POC: CLEAR
COLOR UR: YELLOW
EXPIRATION DATE: ABNORMAL
GLUCOSE UR STRIP-MCNC: ABNORMAL MG/DL
KETONES UR QL: NEGATIVE
LEUKOCYTE EST, POC: ABNORMAL
Lab: ABNORMAL
NITRITE UR-MCNC: NEGATIVE MG/ML
PH UR: 5.5 [PH] (ref 5–8)
PROT UR STRIP-MCNC: ABNORMAL MG/DL
RBC # UR STRIP: ABNORMAL /UL
SP GR UR: 1 (ref 1–1.03)
URINE VOLUME: NORMAL
UROBILINOGEN UR QL: ABNORMAL

## 2025-07-11 NOTE — PROGRESS NOTES
"Chief Complaint  Benign Prostatic Hypertrophy    Subjective            Tripp Younger presents to [facility]    History of Present Illness  The patient presents for evaluation of urinary issues.    He reports a burning sensation during urination that persisted until the fourth day. However, he experienced a more normal urinary pattern last night and this morning, with no discomfort during urination. He also mentions that he has not had any casts in his urine. He is concerned about potential bladder issues causing weight loss.       Objective   Vital Signs:   Ht 182.9 cm (72\")   Wt 58.5 kg (129 lb)   BMI 17.50 kg/m²       Physical Exam  Vitals and nursing note reviewed.   Constitutional:       Appearance: Normal appearance. He is well-developed.   Pulmonary:      Effort: Pulmonary effort is normal.      Breath sounds: Normal air entry.   Neurological:      Mental Status: He is alert and oriented to person, place, and time.      Motor: Motor function is intact.   Psychiatric:         Mood and Affect: Mood normal.         Behavior: Behavior normal.          Result Review :   The following data was reviewed by: Shyann Lin MD on 07/11/2025:    Results for orders placed or performed in visit on 07/11/25   Bladder Scan    Collection Time: 07/11/25 11:02 AM   Result Value Ref Range    Urine Volume 161ml    POC Urinalysis Dipstick, Automated    Collection Time: 07/11/25 11:03 AM    Specimen: Urine   Result Value Ref Range    Color Yellow Yellow, Straw, Dark Yellow, Bren    Clarity, UA Clear Clear    Specific Gravity  1.005 1.005 - 1.030    pH, Urine 5.5 5.0 - 8.0    Leukocytes Small (1+) (A) Negative    Nitrite, UA Negative Negative    Protein, POC 30 mg/dL (A) Negative mg/dL    Glucose,  mg/dL (A) Negative mg/dL    Ketones, UA Negative Negative    Urobilinogen, UA 0.2 E.U./dL Normal, 0.2 E.U./dL    Bilirubin Negative Negative    Blood, UA Large (A) Negative    Lot Number [id number] "     Expiration Date 62,026          Results  Diagnostic Testing   - Prostate tissue biopsy: Benign, no cancer detected   Bladder Scan interpretation 07/11/2025    Estimation of residual urine via ReadmillI 3000 Verathon Bladder Scan  MA/nurse performing: Estrellita Moreland MA  Residual Urine: 161 ml  Indication: Urinary retention   Position: Supine  Examination: Incremental scanning of the suprapubic area using 2.0 MHz transducer using copious amounts of acoustic gel.   Findings: An anechoic area was demonstrated which represented the bladder, with measurement of residual urine as noted. I inspected this myself. In that the residual urine was stable or insignificant, refer to plan for treatment and plan necessary at this time.              Assessment and Plan    Diagnoses and all orders for this visit:    1. Urinary retention (Primary)  -     POC Urinalysis Dipstick, Automated  -     Bladder Scan      Assessment & Plan  1. Urinary issues.  Improvement in urinary symptoms is noted, with no burning sensation and the ability to urinate normally. Pathology results confirmed that the prostate tissue removed was benign, with no signs of cancer. Monitoring of symptoms is advised, with instructions to report any worsening or new issues before the next scheduled visit.    Follow-up  A follow-up appointment is scheduled for 01/2026.          Follow Up       Return in about 6 months (around 1/11/2026) for Next scheduled follow up, PVR check.  Patient was given instructions and counseling regarding his condition or for health maintenance advice. Please see specific information pulled into the AVS if appropriate.     Transcribed from ambient dictation for Shyann Lin MD by Shyann Lin MD.  07/11/25   11:58 EDT    Patient or patient representative verbalized consent for the use of Ambient Listening during the visit with  Shyann Lin MD for chart documentation. 7/11/2025  11:58 EDT

## (undated) DEVICE — TRAY,ADD A CATH,FOLEY,DRAIN BG,10ML SYR: Brand: MEDLINE

## (undated) DEVICE — HF-RESECTION ELECTRODE PLASMALOOP LOOP, MEDIUM, 24 FR., 12°/16°, PK TURIS: Brand: OLYMPUS

## (undated) DEVICE — GOWN,SIRUS,POLYRNF,BRTHSLV,XL,30/CS: Brand: MEDLINE

## (undated) DEVICE — EVAC BLDR ELLIK 1P/U

## (undated) DEVICE — CONTAINER,SPEC,PNEUM TUBE,4OZ,STRL PATH: Brand: MEDLINE

## (undated) DEVICE — MAT FLR ABS W/BLU/LINER 56X72IN WHT

## (undated) DEVICE — CATH FOL BARDEX 2WY 22F 30CC

## (undated) DEVICE — GLOVE,SURG,SENSICARE SLT,LF,PF,6.5: Brand: MEDLINE

## (undated) DEVICE — Device

## (undated) DEVICE — SKIN PREP TRAY W/CHG: Brand: MEDLINE INDUSTRIES, INC.

## (undated) DEVICE — TOWEL,OR,DSP,ST,BLUE,STD,4/PK,20PK/CS: Brand: MEDLINE

## (undated) DEVICE — CYSTO PACK: Brand: MEDLINE INDUSTRIES, INC.

## (undated) DEVICE — SET, IRRIGATION CYSTO, Y-TYPE, 81": Brand: MEDLINE

## (undated) DEVICE — TUBING, SUCTION, 1/4" X 10', STRAIGHT: Brand: MEDLINE

## (undated) DEVICE — THE STERILE LIGHT HANDLE COVER IS USED WITH STERIS SURGICAL LIGHTING AND VISUALIZATION SYSTEMS.

## (undated) DEVICE — SOL IRR NACL 0.9PCT 3000ML

## (undated) DEVICE — TRAY,IRRIGATION,BULBSYRINGE,60ML,CSR,PVP: Brand: MEDLINE